# Patient Record
Sex: FEMALE | Race: WHITE | Employment: FULL TIME | URBAN - METROPOLITAN AREA
[De-identification: names, ages, dates, MRNs, and addresses within clinical notes are randomized per-mention and may not be internally consistent; named-entity substitution may affect disease eponyms.]

---

## 2017-01-17 ENCOUNTER — HOSPITAL ENCOUNTER (OUTPATIENT)
Age: 32
Discharge: HOME | End: 2017-01-17
Attending: FAMILY MEDICINE
Payer: COMMERCIAL

## 2017-01-17 VITALS
DIASTOLIC BLOOD PRESSURE: 93 MMHG | SYSTOLIC BLOOD PRESSURE: 128 MMHG | OXYGEN SATURATION: 100 % | HEART RATE: 64 BPM | RESPIRATION RATE: 16 BRPM | TEMPERATURE: 97 F

## 2017-01-17 DIAGNOSIS — H69.83 EUSTACHIAN TUBE DYSFUNCTION, BILATERAL: ICD-10-CM

## 2017-01-17 DIAGNOSIS — J01.00 ACUTE NON-RECURRENT MAXILLARY SINUSITIS: Primary | ICD-10-CM

## 2017-01-17 PROCEDURE — 99213 OFFICE O/P EST LOW 20 MIN: CPT

## 2017-01-17 PROCEDURE — 99214 OFFICE O/P EST MOD 30 MIN: CPT

## 2017-01-17 RX ORDER — FLUTICASONE PROPIONATE 50 MCG
SPRAY, SUSPENSION (ML) NASAL
Qty: 1 INHALER | Refills: 0 | Status: SHIPPED | OUTPATIENT
Start: 2017-01-17 | End: 2020-01-11

## 2017-01-17 RX ORDER — CLARITHROMYCIN 500 MG/1
500 TABLET, COATED ORAL 2 TIMES DAILY
Qty: 20 TABLET | Refills: 0 | Status: SHIPPED | OUTPATIENT
Start: 2017-01-17 | End: 2017-01-27

## 2017-01-17 NOTE — ED PROVIDER NOTES
Patient Seen in: [facility] END    History   Patient presents with:  Sinus Problem    Stated Complaint: 4 DAYS SINUS ISSUES    HPI    This 49-year-old female presents to the office with a 4-5 day history of worsening sinus pain prim 100%  LMP 01/13/2017        Physical Exam    General: WH/WN/WD, in no respiratory distress, A and O times 3  HEAD: Normocephalic, atraumatic  EYES: Sclera anicteric,  conjunctiva normal.  EARS: Tympanic membranes normal , but both are retracted, no fluid i shower. Stop if you develop nose bleeds. Qty: 1 Inhaler Refills: 0  Associated Diagnoses:Acute non-recurrent maxillary sinusitis; Eustachian tube dysfunction, bilateral        Take the Biaxin 500 mg 1 tablet with breakfast and dinner for the next 10 days.

## 2017-01-17 NOTE — ED INITIAL ASSESSMENT (HPI)
Patient presents to [facility]. Care with cc of sinus congestion/pressure and postnasal drip. States her symptoms started four days with diarrhea,fever and nausea and now has settled into her sinuses. No fever since yesterday(tmac 101),chills. No flu shot. People at

## 2017-08-30 ENCOUNTER — HOSPITAL ENCOUNTER (OUTPATIENT)
Age: 32
Discharge: HOME | End: 2017-08-30
Attending: FAMILY MEDICINE
Payer: COMMERCIAL

## 2017-08-30 VITALS
BODY MASS INDEX: 25.61 KG/M2 | WEIGHT: 150 LBS | RESPIRATION RATE: 18 BRPM | SYSTOLIC BLOOD PRESSURE: 123 MMHG | HEIGHT: 64 IN | HEART RATE: 79 BPM | TEMPERATURE: 99 F | DIASTOLIC BLOOD PRESSURE: 79 MMHG | OXYGEN SATURATION: 100 %

## 2017-08-30 DIAGNOSIS — L01.00 IMPETIGO: Primary | ICD-10-CM

## 2017-08-30 PROCEDURE — 99214 OFFICE O/P EST MOD 30 MIN: CPT

## 2017-08-30 PROCEDURE — 99213 OFFICE O/P EST LOW 20 MIN: CPT

## 2017-08-30 RX ORDER — AZITHROMYCIN 250 MG/1
250 TABLET, FILM COATED ORAL DAILY
COMMUNITY
End: 2020-01-11

## 2017-08-30 NOTE — ED PROVIDER NOTES
Patient Seen in: [facility]    History   Patient presents with:  Bite (integumentary)    Stated Complaint: insect bite on leg x 3 weeks / red streak on leg    HPI  28 yo F here with lesions noted over the L calf - yellow crusty Pulse 79   Temp 99.4 °F (37.4 °C) (Temporal)   Resp 18   Ht 162.6 cm (5' 4\")   Wt 68 kg   LMP 08/11/2017   SpO2 100%   BMI 25.75 kg/m²     Physical Exam  GEN: Not in any acute distress, making good conversation, answering appropriately   SKIN: No pallor,

## 2017-08-30 NOTE — ED INITIAL ASSESSMENT (HPI)
Patient presents with cc left lower medial leg insect bite from camping three weeks ago. Noted small red streak today with some purulent drainage. Low grade fever. Being treated for sinusitis currently with azithromicin.

## 2019-04-26 ENCOUNTER — HOSPITAL ENCOUNTER (OUTPATIENT)
Age: 34
Discharge: HOME | End: 2019-04-26
Attending: EMERGENCY MEDICINE
Payer: COMMERCIAL

## 2019-04-26 VITALS
RESPIRATION RATE: 18 BRPM | TEMPERATURE: 99 F | DIASTOLIC BLOOD PRESSURE: 94 MMHG | OXYGEN SATURATION: 100 % | SYSTOLIC BLOOD PRESSURE: 134 MMHG | HEART RATE: 79 BPM

## 2019-04-26 DIAGNOSIS — J06.9 VIRAL URI WITH COUGH: Primary | ICD-10-CM

## 2019-04-26 PROCEDURE — 99213 OFFICE O/P EST LOW 20 MIN: CPT

## 2019-04-26 PROCEDURE — 99214 OFFICE O/P EST MOD 30 MIN: CPT

## 2019-04-26 RX ORDER — BENZONATATE 100 MG/1
100 CAPSULE ORAL 3 TIMES DAILY PRN
Qty: 30 CAPSULE | Refills: 0 | Status: SHIPPED | OUTPATIENT
Start: 2019-04-26 | End: 2019-05-26

## 2019-04-26 NOTE — ED PROVIDER NOTES
Patient Seen in: [facility]    History   Patient presents with:  Cough/URI    Stated Complaint: cough congestion headache x 3 days     HPI    53-year-old female presents emergency room with chief complaint of cough.   Patient sta exudate, clear postnasal drip present, uvula midline. Tympanic membranes are clear bilaterally, there is no external auditory canal swelling, there is no mastoid erythema or tenderness. Scalp is atraumatic.   NECK: Neck is supple, there is no nuchal rigid

## 2020-01-11 ENCOUNTER — OFFICE VISIT (OUTPATIENT)
Dept: FAMILY MEDICINE CLINIC | Facility: CLINIC | Age: 35
End: 2020-01-11
Payer: COMMERCIAL

## 2020-01-11 VITALS
OXYGEN SATURATION: 99 % | HEIGHT: 64 IN | RESPIRATION RATE: 18 BRPM | BODY MASS INDEX: 20.14 KG/M2 | SYSTOLIC BLOOD PRESSURE: 112 MMHG | HEART RATE: 56 BPM | TEMPERATURE: 98 F | DIASTOLIC BLOOD PRESSURE: 82 MMHG | WEIGHT: 118 LBS

## 2020-01-11 DIAGNOSIS — H10.31 ACUTE CONJUNCTIVITIS OF RIGHT EYE, UNSPECIFIED ACUTE CONJUNCTIVITIS TYPE: Primary | ICD-10-CM

## 2020-01-11 DIAGNOSIS — R09.81 NASAL CONGESTION: ICD-10-CM

## 2020-01-11 PROCEDURE — 99202 OFFICE O/P NEW SF 15 MIN: CPT | Performed by: NURSE PRACTITIONER

## 2020-01-11 RX ORDER — ERYTHROMYCIN 5 MG/G
1 OINTMENT OPHTHALMIC 2 TIMES DAILY
Qty: 1 G | Refills: 0 | Status: SHIPPED | OUTPATIENT
Start: 2020-01-11 | End: 2020-01-16

## 2020-01-11 NOTE — PROGRESS NOTES
HPI:    Patient ID: Kerry Linares is a 37year old female. Eye Problem    This is a new problem. The current episode started yesterday. The problem occurs constantly. The problem has been unchanged. There was no injury mechanism.  The patient is exper Neck: Normal range of motion. Neck supple. Cardiovascular: Normal rate, regular rhythm and normal heart sounds. Pulmonary/Chest: Effort normal and breath sounds normal.   Abdominal: Soft. Musculoskeletal: Normal range of motion.      Lymphadenopathy: Colds are the most common illness that people get. Most adults get 2 or 3 colds per year, and most children get 5 to 7 colds per year. Colds may be caused by over 200 types of viruses. The most common of these are rhinoviruses (“rhino” refers to the nose). Follow all directions for using medicines, especially when giving them to children. Contact your healthcare provider if you have any questions about using cold medicines safely.  Before buying cold medicines, make a list of any prescription medicines you ta © 9140-5326 [address]. [address] [facility], [facility] [place]. All rights reserved. This information is not intended as a substitute for professional medical care. Always follow your healthcare professional's instructions.

## 2020-01-11 NOTE — PATIENT INSTRUCTIONS
Wash hands frequently.   Eye infections can be easily spread to unaffected eye and other family members  Bacterial eye infections are contagious for 24-48 hours after therapy begins  Viral eye infections are contagious for at least 7 days after onset  Use e relieving symptoms. Treatments may include:  · Decongestant medicines. Several types of decongestants are available without prescription. These may help reduce stuffy or runny nose symptoms. · Prescription or over-the-counter nasal sprays.  These may help infection  · Lung infection, such as bronchitis or pneumonia  · Ear infection  If you have asthma or chronic bronchitis, a cold can make your condition worse. When should I call my healthcare provider?   Call your healthcare provider right away if you have

## 2020-08-17 ENCOUNTER — APPOINTMENT (OUTPATIENT)
Dept: MRI IMAGING | Age: 35
DRG: 058 | End: 2020-08-17
Attending: PSYCHIATRY & NEUROLOGY

## 2020-08-17 ENCOUNTER — HOSPITAL ENCOUNTER (INPATIENT)
Age: 35
LOS: 2 days | Discharge: HOME | DRG: 058 | End: 2020-08-19
Attending: EMERGENCY MEDICINE | Admitting: INTERNAL MEDICINE

## 2020-08-17 DIAGNOSIS — R44.9 LEFT-SIDED SENSORY DEFICIT PRESENT: Primary | ICD-10-CM

## 2020-08-17 PROBLEM — G95.9 MYELOPATHY (HCC): Status: ACTIVE | Noted: 2020-08-17

## 2020-08-17 LAB
BASOPHILS # BLD: 0.1 K/MCL (ref 0–0.3)
BASOPHILS NFR BLD: 1 %
DIFFERENTIAL METHOD BLD: NORMAL
EOSINOPHIL # BLD: 0.2 K/MCL (ref 0.1–0.5)
EOSINOPHIL NFR BLD: 2 %
ERYTHROCYTE [DISTWIDTH] IN BLOOD: 11.9 % (ref 11–15)
HCT VFR BLD CALC: 45.6 % (ref 36–46.5)
HGB BLD-MCNC: 14.7 G/DL (ref 12–15.5)
IMM GRANULOCYTES # BLD AUTO: 0 K/MCL (ref 0–0.2)
IMM GRANULOCYTES NFR BLD: 0 %
INR PPP: 1
LYMPHOCYTES # BLD: 1.5 K/MCL (ref 1–4.8)
LYMPHOCYTES NFR BLD: 18 %
MCH RBC QN AUTO: 29.1 PG (ref 26–34)
MCHC RBC AUTO-ENTMCNC: 32.2 G/DL (ref 32–36.5)
MCV RBC AUTO: 90.1 FL (ref 78–100)
MONOCYTES # BLD: 0.5 K/MCL (ref 0.3–0.9)
MONOCYTES NFR BLD: 6 %
NEUTROPHILS # BLD: 5.9 K/MCL (ref 1.8–7.7)
NEUTROPHILS NFR BLD: 73 %
NRBC BLD MANUAL-RTO: 0 /100 WBC
PLATELET # BLD: 173 K/MCL (ref 140–450)
PROTHROMBIN TIME: 11 SEC (ref 9.7–11.8)
RBC # BLD: 5.06 MIL/MCL (ref 4–5.2)
TSH SERPL-ACNC: 2.07 MCUNITS/ML (ref 0.35–5)
WBC # BLD: 8.1 K/MCL (ref 4.2–11)

## 2020-08-17 PROCEDURE — 10002807 HB RX 258: Performed by: EMERGENCY MEDICINE

## 2020-08-17 PROCEDURE — 70553 MRI BRAIN STEM W/O & W/DYE: CPT

## 2020-08-17 PROCEDURE — 82164 ANGIOTENSIN I ENZYME TEST: CPT

## 2020-08-17 PROCEDURE — 86256 FLUORESCENT ANTIBODY TITER: CPT

## 2020-08-17 PROCEDURE — 86592 SYPHILIS TEST NON-TREP QUAL: CPT

## 2020-08-17 PROCEDURE — 10002805 HB CONTRAST AGENT: Performed by: PSYCHIATRY & NEUROLOGY

## 2020-08-17 PROCEDURE — 85610 PROTHROMBIN TIME: CPT

## 2020-08-17 PROCEDURE — 10000002 HB ROOM CHARGE MED SURG

## 2020-08-17 PROCEDURE — 10002800 HB RX 250 W HCPCS: Performed by: EMERGENCY MEDICINE

## 2020-08-17 PROCEDURE — 86255 FLUORESCENT ANTIBODY SCREEN: CPT

## 2020-08-17 PROCEDURE — 86038 ANTINUCLEAR ANTIBODIES: CPT

## 2020-08-17 PROCEDURE — 84443 ASSAY THYROID STIM HORMONE: CPT

## 2020-08-17 PROCEDURE — 36415 COLL VENOUS BLD VENIPUNCTURE: CPT

## 2020-08-17 PROCEDURE — 86617 LYME DISEASE ANTIBODY: CPT

## 2020-08-17 PROCEDURE — 86235 NUCLEAR ANTIGEN ANTIBODY: CPT

## 2020-08-17 PROCEDURE — 72157 MRI CHEST SPINE W/O & W/DYE: CPT

## 2020-08-17 PROCEDURE — 82525 ASSAY OF COPPER: CPT

## 2020-08-17 PROCEDURE — 99285 EMERGENCY DEPT VISIT HI MDM: CPT

## 2020-08-17 PROCEDURE — 85025 COMPLETE CBC W/AUTO DIFF WBC: CPT

## 2020-08-17 PROCEDURE — A9585 GADOBUTROL INJECTION: HCPCS | Performed by: PSYCHIATRY & NEUROLOGY

## 2020-08-17 PROCEDURE — 72156 MRI NECK SPINE W/O & W/DYE: CPT

## 2020-08-17 PROCEDURE — 82746 ASSAY OF FOLIC ACID SERUM: CPT

## 2020-08-17 RX ORDER — GADOBUTROL 604.72 MG/ML
7.5 INJECTION INTRAVENOUS ONCE
Status: CANCELLED | OUTPATIENT
Start: 2020-08-17 | End: 2020-08-17

## 2020-08-17 RX ORDER — GADOBUTROL 604.72 MG/ML
7.5 INJECTION INTRAVENOUS ONCE
Status: COMPLETED | OUTPATIENT
Start: 2020-08-17 | End: 2020-08-17

## 2020-08-17 RX ADMIN — GADOBUTROL 7.5 ML: 604.72 INJECTION INTRAVENOUS at 19:15

## 2020-08-17 RX ADMIN — SODIUM CHLORIDE 1000 MG: 9 INJECTION, SOLUTION INTRAVENOUS at 23:32

## 2020-08-17 ASSESSMENT — ENCOUNTER SYMPTOMS
SHORTNESS OF BREATH: 0
NAUSEA: 0
VOMITING: 0
DIARRHEA: 0
DIZZINESS: 0
NUMBNESS: 1
FEVER: 0
SORE THROAT: 0
LIGHT-HEADEDNESS: 0
CHILLS: 0
ABDOMINAL PAIN: 0
COUGH: 0
BACK PAIN: 0
ADENOPATHY: 0

## 2020-08-17 ASSESSMENT — MOVEMENT AND STRENGTH ASSESSMENTS
LUE: EQUAL STRENGTH/TONE/MOVEMENT
LLE: EQUAL STRENGTH/TONE/MOVEMENT

## 2020-08-17 ASSESSMENT — PAIN SCALES - GENERAL
PAINLEVEL_OUTOF10: 0
PAINLEVEL_OUTOF10: 0

## 2020-08-18 ENCOUNTER — APPOINTMENT (OUTPATIENT)
Dept: GENERAL RADIOLOGY | Age: 35
DRG: 058 | End: 2020-08-18
Attending: PSYCHIATRY & NEUROLOGY

## 2020-08-18 LAB
# SPEC OBTAINED: 3
ALBUMIN SERPL-MCNC: 4.6 G/DL (ref 3.6–5.1)
ALBUMIN/GLOB SERPL: 1.4 {RATIO} (ref 1–2.4)
ALP SERPL-CCNC: 51 UNITS/L (ref 45–117)
ALT SERPL-CCNC: 18 UNITS/L
ANA SER QL IA: NEGATIVE
ANION GAP SERPL CALC-SCNC: 11 MMOL/L (ref 10–20)
APPEARANCE CSF: NORMAL
AST SERPL-CCNC: 14 UNITS/L
BILIRUB SERPL-MCNC: 1.4 MG/DL (ref 0.2–1)
BUN SERPL-MCNC: 12 MG/DL (ref 6–20)
BUN/CREAT SERPL: 18 (ref 7–25)
C GATTII+NEOFOR DNA CSF QL NAA+NON-PROBE: NOT DETECTED
CALCIUM SERPL-MCNC: 9.1 MG/DL (ref 8.4–10.2)
CHLORIDE SERPL-SCNC: 106 MMOL/L (ref 98–107)
CMV DNA CSF QL NAA+NON-PROBE: NOT DETECTED
CO2 SERPL-SCNC: 25 MMOL/L (ref 21–32)
CREAT SERPL-MCNC: 0.65 MG/DL (ref 0.51–0.95)
CSF DIFFERENTIAL REMARKS(CREM): NORMAL
E COLI K1 DNA CSF QL NAA+NON-PROBE: NOT DETECTED
ERYTHROCYTE [DISTWIDTH] IN BLOOD: 12 % (ref 11–15)
EV RNA CSF QL NAA+NON-PROBE: NOT DETECTED
FOLATE SERPL-MCNC: 17.2 NG/ML
GLOBULIN SER-MCNC: 3.3 G/DL (ref 2–4)
GLUCOSE CSF-MCNC: 86 MG/DL (ref 40–70)
GLUCOSE SERPL-MCNC: 175 MG/DL (ref 65–99)
GP B STREP DNA CSF QL NAA+NON-PROBE: NOT DETECTED
HAEM INFLU DNA CSF QL NAA+NON-PROBE: NOT DETECTED
HBA1C MFR BLD: 4.5 % (ref 4.5–5.6)
HCT VFR BLD CALC: 48.3 % (ref 36–46.5)
HGB BLD-MCNC: 15.8 G/DL (ref 12–15.5)
HHV6 DNA CSF QL NAA+NON-PROBE: NOT DETECTED
HSV1 DNA CSF QL NAA+NON-PROBE: NOT DETECTED
HSV2 DNA CSF QL NAA+NON-PROBE: NOT DETECTED
INR PPP: 1.1
IRON SERPL-MCNC: 80 MCG/DL (ref 50–170)
L MONOCYTOG DNA CSF QL NAA+NON-PROBE: NOT DETECTED
MCH RBC QN AUTO: 29.5 PG (ref 26–34)
MCHC RBC AUTO-ENTMCNC: 32.7 G/DL (ref 32–36.5)
MCV RBC AUTO: 90.3 FL (ref 78–100)
N MEN DNA CSF QL NAA+NON-PROBE: NOT DETECTED
NRBC BLD MANUAL-RTO: 0 /100 WBC
NUC CELL # CSF: 2 /MCL (ref 0–10)
PARECHOVIRUS A RNA CSF QL NAA+NON-PROBE: NOT DETECTED
PLATELET # BLD: 176 K/MCL (ref 140–450)
POTASSIUM SERPL-SCNC: 3.7 MMOL/L (ref 3.4–5.1)
PROT CSF-MCNC: 38 MG/DL (ref 15–45)
PROT SERPL-MCNC: 7.9 G/DL (ref 6.4–8.2)
PROTHROMBIN TIME: 11.3 SEC (ref 9.7–11.8)
RBC # BLD: 5.35 MIL/MCL (ref 4–5.2)
RBC # CSF: NORMAL /MCL
RPR SER QL: NONREACTIVE
S PNEUM DNA CSF QL NAA+NON-PROBE: NOT DETECTED
SODIUM SERPL-SCNC: 138 MMOL/L (ref 135–145)
SPECIMEN SOURCE: NORMAL
SPECIMEN VOL CSF: 11.5 ML
TUBE # CSF: 3
VIT B12 SERPL-MCNC: 652 PG/ML (ref 211–911)
VZV DNA CSF QL NAA+NON-PROBE: NOT DETECTED
WBC # BLD: 9.6 K/MCL (ref 4.2–11)
XANTHOCHROMIA CSF QL: NORMAL

## 2020-08-18 PROCEDURE — 10002800 HB RX 250 W HCPCS: Performed by: PSYCHIATRY & NEUROLOGY

## 2020-08-18 PROCEDURE — 36415 COLL VENOUS BLD VENIPUNCTURE: CPT

## 2020-08-18 PROCEDURE — 86592 SYPHILIS TEST NON-TREP QUAL: CPT

## 2020-08-18 PROCEDURE — 10002803 HB RX 637: Performed by: PSYCHIATRY & NEUROLOGY

## 2020-08-18 PROCEDURE — 80053 COMPREHEN METABOLIC PANEL: CPT

## 2020-08-18 PROCEDURE — 83540 ASSAY OF IRON: CPT

## 2020-08-18 PROCEDURE — 85610 PROTHROMBIN TIME: CPT

## 2020-08-18 PROCEDURE — 82784 ASSAY IGA/IGD/IGG/IGM EACH: CPT

## 2020-08-18 PROCEDURE — 83036 HEMOGLOBIN GLYCOSYLATED A1C: CPT

## 2020-08-18 PROCEDURE — 10004651 HB RX, NO CHARGE ITEM: Performed by: INTERNAL MEDICINE

## 2020-08-18 PROCEDURE — 87205 SMEAR GRAM STAIN: CPT

## 2020-08-18 PROCEDURE — 85027 COMPLETE CBC AUTOMATED: CPT

## 2020-08-18 PROCEDURE — 10000002 HB ROOM CHARGE MED SURG

## 2020-08-18 PROCEDURE — 009U3ZX DRAINAGE OF SPINAL CANAL, PERCUTANEOUS APPROACH, DIAGNOSTIC: ICD-10-PCS | Performed by: RADIOLOGY

## 2020-08-18 PROCEDURE — 87483 CNS DNA AMP PROBE TYPE 12-25: CPT

## 2020-08-18 PROCEDURE — 10002807 HB RX 258: Performed by: PSYCHIATRY & NEUROLOGY

## 2020-08-18 PROCEDURE — 97161 PT EVAL LOW COMPLEX 20 MIN: CPT | Performed by: PHYSICAL THERAPIST

## 2020-08-18 PROCEDURE — 86618 LYME DISEASE ANTIBODY: CPT

## 2020-08-18 PROCEDURE — 62328 DX LMBR SPI PNXR W/FLUOR/CT: CPT

## 2020-08-18 PROCEDURE — 84157 ASSAY OF PROTEIN OTHER: CPT

## 2020-08-18 PROCEDURE — 89051 BODY FLUID CELL COUNT: CPT

## 2020-08-18 PROCEDURE — 82945 GLUCOSE OTHER FLUID: CPT

## 2020-08-18 RX ORDER — PANTOPRAZOLE SODIUM 40 MG/1
40 TABLET, DELAYED RELEASE ORAL NIGHTLY
Status: DISCONTINUED | OUTPATIENT
Start: 2020-08-18 | End: 2020-08-19 | Stop reason: HOSPADM

## 2020-08-18 RX ORDER — ONDANSETRON 2 MG/ML
4 INJECTION INTRAMUSCULAR; INTRAVENOUS EVERY 12 HOURS PRN
Status: DISCONTINUED | OUTPATIENT
Start: 2020-08-18 | End: 2020-08-19 | Stop reason: HOSPADM

## 2020-08-18 RX ORDER — METHYLPREDNISOLONE SODIUM SUCCINATE 125 MG/2ML
INJECTION, POWDER, LYOPHILIZED, FOR SOLUTION INTRAMUSCULAR; INTRAVENOUS
Status: DISPENSED
Start: 2020-08-18 | End: 2020-08-19

## 2020-08-18 RX ORDER — ACETAMINOPHEN 325 MG/1
650 TABLET ORAL EVERY 4 HOURS PRN
Status: DISCONTINUED | OUTPATIENT
Start: 2020-08-18 | End: 2020-08-19 | Stop reason: HOSPADM

## 2020-08-18 RX ORDER — PREDNISONE 10 MG/1
TABLET ORAL
Qty: 45 TABLET | Refills: 0 | Status: SHIPPED | OUTPATIENT
Start: 2020-08-18

## 2020-08-18 RX ADMIN — ACETAMINOPHEN 650 MG: 325 TABLET, FILM COATED ORAL at 18:58

## 2020-08-18 RX ADMIN — SODIUM CHLORIDE 1000 MG: 9 INJECTION, SOLUTION INTRAVENOUS at 16:52

## 2020-08-18 RX ADMIN — PANTOPRAZOLE SODIUM 40 MG: 40 TABLET, DELAYED RELEASE ORAL at 22:24

## 2020-08-18 ASSESSMENT — COLUMBIA-SUICIDE SEVERITY RATING SCALE - C-SSRS
IS THE PATIENT ABLE TO COMPLETE C-SSRS: YES
1. WITHIN THE PAST MONTH, HAVE YOU WISHED YOU WERE DEAD OR WISHED YOU COULD GO TO SLEEP AND NOT WAKE UP?: NO
2. HAVE YOU ACTUALLY HAD ANY THOUGHTS OF KILLING YOURSELF?: NO
6. HAVE YOU EVER DONE ANYTHING, STARTED TO DO ANYTHING, OR PREPARED TO DO ANYTHING TO END YOUR LIFE?: NO

## 2020-08-18 ASSESSMENT — LIFESTYLE VARIABLES
ALCOHOL_USE_STATUS: UNHEALTHY DRINKING IDENTIFIED. AUDIT C: 3 OR MORE FOR WOMEN AND 4 OR MORE FOR MEN.
HOW OFTEN DO YOU HAVE A DRINK CONTAINING ALCOHOL: 4 OR MORE TIMES PER WEEK
HOW OFTEN DO YOU HAVE 6 OR MORE DRINKS ON ONE OCCASION: NEVER
LAST DRINK YOU HAD: 49 HOURS OR MORE
AUDIT-C TOTAL SCORE: 5
HOW MANY STANDARD DRINKS CONTAINING ALCOHOL DO YOU HAVE ON A TYPICAL DAY: 3 OR 4

## 2020-08-18 ASSESSMENT — COGNITIVE AND FUNCTIONAL STATUS - GENERAL
ARE YOU BLIND OR DO YOU HAVE SERIOUS DIFFICULTY SEEING, EVEN WHEN WEARING GLASSES: NO
BASIC_MOBILITY_RAW_SCORE: 24
BECAUSE OF A PHYSICAL, MENTAL, OR EMOTIONAL CONDITION, DO YOU HAVE DIFFICULTY DOING ERRANDS ALONE: NO
BASIC_MOBILITY_CONVERTED_SCORE: 57.68
BECAUSE OF A PHYSICAL, MENTAL, OR EMOTIONAL CONDITION, DO YOU HAVE SERIOUS DIFFICULTY CONCENTRATING, REMEMBERING OR MAKING DECISIONS: NO
DO YOU HAVE DIFFICULTY DRESSING OR BATHING: NO
ARE YOU DEAF OR DO YOU HAVE SERIOUS DIFFICULTY  HEARING: NO
DO YOU HAVE SERIOUS DIFFICULTY WALKING OR CLIMBING STAIRS: NO

## 2020-08-18 ASSESSMENT — PATIENT HEALTH QUESTIONNAIRE - PHQ9
SUM OF ALL RESPONSES TO PHQ9 QUESTIONS 1 AND 2: 0
IS PATIENT ABLE TO COMPLETE PHQ2 OR PHQ9: YES
1. LITTLE INTEREST OR PLEASURE IN DOING THINGS: NOT AT ALL
2. FEELING DOWN, DEPRESSED OR HOPELESS: NOT AT ALL
CLINICAL INTERPRETATION OF PHQ9 SCORE: NO FURTHER SCREENING NEEDED
CLINICAL INTERPRETATION OF PHQ2 SCORE: NO FURTHER SCREENING NEEDED
SUM OF ALL RESPONSES TO PHQ9 QUESTIONS 1 AND 2: 0

## 2020-08-18 ASSESSMENT — ACTIVITIES OF DAILY LIVING (ADL)
ADL_BEFORE_ADMISSION: INDEPENDENT
RECENT_DECLINE_ADL: NO
PRIOR_ADL: INDEPENDENT
CHRONIC_PAIN_PRESENT: NO
ADL_SHORT_OF_BREATH: NO
PRIOR_ADL_BATHING: INDEPENDENT
ADL_SCORE: 12
EATING: INDEPENDENT
PRIOR_ADL_TOILETING: INDEPENDENT

## 2020-08-18 ASSESSMENT — PAIN SCALES - GENERAL
PAINLEVEL_OUTOF10: 0

## 2020-08-19 VITALS
TEMPERATURE: 98.1 F | HEIGHT: 64 IN | BODY MASS INDEX: 21.38 KG/M2 | WEIGHT: 125.22 LBS | SYSTOLIC BLOOD PRESSURE: 119 MMHG | RESPIRATION RATE: 14 BRPM | HEART RATE: 65 BPM | DIASTOLIC BLOOD PRESSURE: 77 MMHG | OXYGEN SATURATION: 100 %

## 2020-08-19 LAB
ACE SERPL-CCNC: 67 U/L (ref 9–67)
ALBUMIN CSF-MCNC: 21 MG/DL (ref 0–35)
ALBUMIN SERPL-MCNC: 5040 MG/DL
COPPER SERPL-MCNC: 96 MCG/DL (ref 80–155)
IGG CSF-MCNC: 2.2 MG/DL (ref 0.9–6.1)
IGG SERPL-MCNC: 892 MG/DL
IGG SYNTH RATE SER+CSF CALC-MRATE: ABNORMAL MG/DAY (ref 0–8)
IGG/ALB CLEAR SER+CSF-RTO: 0.1 (ref 0.09–0.25)
IGG/ALB CLEAR SER+CSF-RTO: 0.59
PROT CSF-MCNC: 38 MG/DL (ref 15–45)
PROT PATTERN CSF ELPH-IMP: ABNORMAL

## 2020-08-19 PROCEDURE — 10002807 HB RX 258: Performed by: PSYCHIATRY & NEUROLOGY

## 2020-08-19 PROCEDURE — 10002800 HB RX 250 W HCPCS: Performed by: PSYCHIATRY & NEUROLOGY

## 2020-08-19 RX ADMIN — SODIUM CHLORIDE 1000 MG: 9 INJECTION, SOLUTION INTRAVENOUS at 14:01

## 2020-08-19 ASSESSMENT — PAIN SCALES - GENERAL
PAINLEVEL_OUTOF10: 0

## 2020-08-20 LAB
ANCA AB TITR SER IF: <20 TITER
B BURGDOR AB CSF IA-ACNC: 0.06 LIV
B BURGDOR IGG SER QL IB: NEGATIVE
B BURGDOR IGM SER QL IB: NEGATIVE
BACTERIA CSF CULT: NORMAL
GRAM STN SPEC: NORMAL
P-ANCA TITR SER IF: <20 TITER
REPORT STATUS (RPT): NORMAL
SPECIMEN SOURCE: NORMAL

## 2020-08-21 LAB — AQP4 H2O CHANNEL IGG SERPL QL IF: NORMAL

## 2020-08-25 LAB — VDRL CSF QL: NONREACTIVE

## 2020-08-27 PROBLEM — G37.9 CLINICALLY ISOLATED SYNDROME (HCC): Status: ACTIVE | Noted: 2020-08-27

## 2020-09-23 PROBLEM — G35 MS (MULTIPLE SCLEROSIS) (HCC): Status: ACTIVE | Noted: 2020-09-23

## 2021-04-10 ENCOUNTER — HOSPITAL ENCOUNTER (OUTPATIENT)
Age: 36
Discharge: HOME | End: 2021-04-10
Attending: EMERGENCY MEDICINE
Payer: COMMERCIAL

## 2021-04-10 VITALS
HEART RATE: 87 BPM | SYSTOLIC BLOOD PRESSURE: 120 MMHG | HEIGHT: 64 IN | DIASTOLIC BLOOD PRESSURE: 80 MMHG | WEIGHT: 128 LBS | TEMPERATURE: 100 F | RESPIRATION RATE: 20 BRPM | OXYGEN SATURATION: 98 % | BODY MASS INDEX: 21.85 KG/M2

## 2021-04-10 DIAGNOSIS — J02.9 THROAT SORENESS: Primary | ICD-10-CM

## 2021-04-10 PROCEDURE — 99213 OFFICE O/P EST LOW 20 MIN: CPT

## 2021-04-10 NOTE — ED PROVIDER NOTES
Patient Seen in: [facility]      History   Patient presents with:  Sore Throat    Stated Complaint: Sore throat ,nausea    HPI/Subjective:   HPI    This is a 38-year-old female past medical history of MS on Ocrevus infusions who presents adenopathy. Lungs: Clear to auscultation bilaterally with no rales, no retractions, and no wheezing. HEART:  Regular rate and rhythm. S1 and S2. No murmurs, no rubs or gallops. ABDOMEN: Soft, nontender and nondistended. Normoactive bowel sounds.  No suellen

## 2021-04-12 NOTE — ED NOTES
Patient called stating she her COVID test is positive and that Dr. Shaheed Ruiz told her to come back to the IC for antibodies, as she has MS.  Per Dr. Pretty Melgar, patient only meets criteria for MS but because Dr. Vieyra Cousin told patient to get antibodies we c

## 2021-07-26 PROBLEM — G35 MULTIPLE SCLEROSIS (HCC): Status: ACTIVE | Noted: 2020-09-23

## 2021-11-08 ENCOUNTER — HOSPITAL ENCOUNTER (OUTPATIENT)
Age: 36
Discharge: HOME | End: 2021-11-08
Payer: COMMERCIAL

## 2021-11-08 VITALS
DIASTOLIC BLOOD PRESSURE: 90 MMHG | HEIGHT: 64 IN | OXYGEN SATURATION: 98 % | SYSTOLIC BLOOD PRESSURE: 130 MMHG | WEIGHT: 125 LBS | BODY MASS INDEX: 21.34 KG/M2 | RESPIRATION RATE: 12 BRPM | TEMPERATURE: 99 F | HEART RATE: 98 BPM

## 2021-11-08 DIAGNOSIS — J06.9 UPPER RESPIRATORY TRACT INFECTION, UNSPECIFIED TYPE: Primary | ICD-10-CM

## 2021-11-08 DIAGNOSIS — Z20.822 LAB TEST NEGATIVE FOR COVID-19 VIRUS: ICD-10-CM

## 2021-11-08 PROCEDURE — 99213 OFFICE O/P EST LOW 20 MIN: CPT

## 2021-11-08 PROCEDURE — 87880 STREP A ASSAY W/OPTIC: CPT

## 2021-11-08 PROCEDURE — 99214 OFFICE O/P EST MOD 30 MIN: CPT

## 2021-11-08 RX ORDER — LIDOCAINE HYDROCHLORIDE 20 MG/ML
5 SOLUTION OROPHARYNGEAL
Qty: 100 ML | Refills: 0 | Status: SHIPPED | OUTPATIENT
Start: 2021-11-08

## 2021-11-08 NOTE — ED PROVIDER NOTES
Patient Seen in: [facility]      History   Patient presents with:  Cough/URI  Testing    Stated Complaint: congestion,ear pain,sore throat    Subjective:   HPI  Patient is 40-year-old female past medical history of multiple sclerosis pres Systems    Positive for stated complaint: congestion,ear pain,sore throat  Other systems are as noted in HPI. Constitutional and vital signs reviewed. All other systems reviewed and negative except as noted above.     Physical Exam     ED Triage Vital tenderness. Abdominal:      General: There is no distension. Tenderness: There is no abdominal tenderness. There is no right CVA tenderness or left CVA tenderness. Musculoskeletal:      Cervical back: Normal range of motion and neck supple.    Lymp

## 2021-11-08 NOTE — ED INITIAL ASSESSMENT (HPI)
Wednesday, c/o sinus congestion and felt tired. Now has a cough, low back discomfort with left ear muffled. Denies fevers.  Here for a rapid covid test.

## 2022-08-31 ENCOUNTER — APPOINTMENT (OUTPATIENT)
Dept: URBAN - METROPOLITAN AREA CLINIC 247 | Age: 37
Setting detail: DERMATOLOGY
End: 2022-08-31

## 2022-08-31 DIAGNOSIS — L91.8 OTHER HYPERTROPHIC DISORDERS OF THE SKIN: ICD-10-CM

## 2022-08-31 DIAGNOSIS — D22 MELANOCYTIC NEVI: ICD-10-CM

## 2022-08-31 DIAGNOSIS — D18.0 HEMANGIOMA: ICD-10-CM

## 2022-08-31 DIAGNOSIS — D485 NEOPLASM OF UNCERTAIN BEHAVIOR OF SKIN: ICD-10-CM

## 2022-08-31 DIAGNOSIS — L82.1 OTHER SEBORRHEIC KERATOSIS: ICD-10-CM

## 2022-08-31 DIAGNOSIS — L81.4 OTHER MELANIN HYPERPIGMENTATION: ICD-10-CM

## 2022-08-31 PROBLEM — D22.5 MELANOCYTIC NEVI OF TRUNK: Status: ACTIVE | Noted: 2022-08-31

## 2022-08-31 PROBLEM — D18.01 HEMANGIOMA OF SKIN AND SUBCUTANEOUS TISSUE: Status: ACTIVE | Noted: 2022-08-31

## 2022-08-31 PROBLEM — D48.5 NEOPLASM OF UNCERTAIN BEHAVIOR OF SKIN: Status: ACTIVE | Noted: 2022-08-31

## 2022-08-31 PROCEDURE — OTHER SHAVE REMOVAL: OTHER

## 2022-08-31 PROCEDURE — 11310 SHAVE SKIN LESION 0.5 CM/<: CPT

## 2022-08-31 PROCEDURE — 99203 OFFICE O/P NEW LOW 30 MIN: CPT | Mod: 25

## 2022-08-31 PROCEDURE — OTHER SKIN TAG REMOVAL (COSMETIC): OTHER

## 2022-08-31 PROCEDURE — OTHER MIPS QUALITY: OTHER

## 2022-08-31 PROCEDURE — A4550 SURGICAL TRAYS: HCPCS

## 2022-08-31 PROCEDURE — OTHER COUNSELING: OTHER

## 2022-08-31 ASSESSMENT — LOCATION ZONE DERM
LOCATION ZONE: LIP
LOCATION ZONE: TRUNK
LOCATION ZONE: NECK

## 2022-08-31 ASSESSMENT — LOCATION SIMPLE DESCRIPTION DERM
LOCATION SIMPLE: CHEST
LOCATION SIMPLE: RIGHT UPPER BACK
LOCATION SIMPLE: LEFT LIP
LOCATION SIMPLE: LEFT ANTERIOR NECK
LOCATION SIMPLE: LEFT UPPER BACK
LOCATION SIMPLE: ABDOMEN

## 2022-08-31 ASSESSMENT — LOCATION DETAILED DESCRIPTION DERM
LOCATION DETAILED: LEFT NASOLABIAL FOLD
LOCATION DETAILED: EPIGASTRIC SKIN
LOCATION DETAILED: RIGHT MID-UPPER BACK
LOCATION DETAILED: LEFT INFERIOR LATERAL NECK
LOCATION DETAILED: LEFT CLAVICULAR NECK
LOCATION DETAILED: MIDDLE STERNUM
LOCATION DETAILED: LEFT SUPERIOR UPPER BACK

## 2022-08-31 NOTE — PROCEDURE: COUNSELING
Detail Level: Zone
Sunscreen Recommendations: SPF 30+ daily, broad spectrum
Detail Level: Detailed
Sunscreen Recommendations: SPF 30+, broad spectrum

## 2022-08-31 NOTE — PROCEDURE: SKIN TAG REMOVAL (COSMETIC)
Price (Use Numbers Only, No Special Characters Or $): 75
Removed With: gradle excision
Detail Level: Simple
Anesthesia Type: 1% lidocaine with epinephrine
Anesthesia Volume In Cc: 3
Consent: Written consent obtained and the risks of skin tag removal was reviewed with the patient including but not limited to bleeding, pigmentary change, infection, pain, and remote possibility of scarring.

## 2022-08-31 NOTE — PROCEDURE: SHAVE REMOVAL
Medical Necessity Information: It is in your best interest to select a reason for this procedure from the list below. All of these items fulfill various CMS LCD requirements except the new and changing color options.
Bill 22154 For Specimen Handling/Conveyance To Laboratory?: no
Notification Instructions: Patient will be notified of pathology results. However, patient instructed to call the office if not contacted within 2 weeks.
Biopsy Method: Personna blade
Was A Bandage Applied: Yes
Path Notes (To The Dermatopathologist): Check margins
Detail Level: Detailed
X Size Of Lesion In Cm (Optional): 0
Anesthesia Volume In Cc: 0.5
Medical Necessity Clause: This procedure was medically necessary because the lesion that was treated was:
Billing Type: Third-Party Bill
Consent was obtained from the patient. The patient was provided with the informed consent document and offered time to review and ask any further questions before signing consent. Prior to the procedure, the treatment site was clearly identified.
Hemostasis: Drysol
Wound Care: Petrolatum
Post-Care Instructions: I reviewed with the patient in detail post-care instructions. Keep the biopsy site dry overnight, and then wash once daily with a gentle cleanser. Afterwards, pat dry and apply Vaseline twice daily and cover with a bandage until healed. For optimal wound healing, apply SPF 30+ or keep covered until pigmentation has faded.\\n\\n\\nStressed the risk and scaring, the importance of wound care and SPF
Anesthesia Type: 1% lidocaine with epinephrine

## 2023-12-07 ENCOUNTER — APPOINTMENT (OUTPATIENT)
Dept: URBAN - METROPOLITAN AREA CLINIC 247 | Age: 38
Setting detail: DERMATOLOGY
End: 2023-12-07

## 2023-12-07 DIAGNOSIS — L57.0 ACTINIC KERATOSIS: ICD-10-CM

## 2023-12-07 DIAGNOSIS — L81.4 OTHER MELANIN HYPERPIGMENTATION: ICD-10-CM

## 2023-12-07 DIAGNOSIS — D22 MELANOCYTIC NEVI: ICD-10-CM

## 2023-12-07 DIAGNOSIS — D18.0 HEMANGIOMA: ICD-10-CM

## 2023-12-07 PROBLEM — D18.01 HEMANGIOMA OF SKIN AND SUBCUTANEOUS TISSUE: Status: ACTIVE | Noted: 2023-12-07

## 2023-12-07 PROBLEM — D22.5 MELANOCYTIC NEVI OF TRUNK: Status: ACTIVE | Noted: 2023-12-07

## 2023-12-07 PROCEDURE — OTHER LIQUID NITROGEN: OTHER

## 2023-12-07 PROCEDURE — OTHER COUNSELING: OTHER

## 2023-12-07 PROCEDURE — 99213 OFFICE O/P EST LOW 20 MIN: CPT | Mod: 25

## 2023-12-07 PROCEDURE — 17000 DESTRUCT PREMALG LESION: CPT

## 2023-12-07 ASSESSMENT — LOCATION ZONE DERM
LOCATION ZONE: ARM
LOCATION ZONE: TRUNK

## 2023-12-07 ASSESSMENT — LOCATION DETAILED DESCRIPTION DERM
LOCATION DETAILED: LEFT SUPERIOR UPPER BACK
LOCATION DETAILED: LEFT ANTERIOR SHOULDER
LOCATION DETAILED: RIGHT INFERIOR UPPER BACK
LOCATION DETAILED: RIGHT MID-UPPER BACK

## 2023-12-07 ASSESSMENT — LOCATION SIMPLE DESCRIPTION DERM
LOCATION SIMPLE: RIGHT UPPER BACK
LOCATION SIMPLE: LEFT SHOULDER
LOCATION SIMPLE: LEFT UPPER BACK

## 2023-12-07 NOTE — PROCEDURE: LIQUID NITROGEN
Render Post-Care Instructions In Note?: no
Duration Of Freeze Thaw-Cycle (Seconds): 1
Number Of Freeze-Thaw Cycles: 1 freeze-thaw cycle
Detail Level: Detailed
Consent: Risks include crusting, scabbing, blistering, scarring, darker or lighter pigmentary change, recurrence, incomplete removal and infection. The patient's consent was obtained prior to treatment.
Show Aperture Variable?: Yes
Application Tool (Optional): Liquid Nitrogen Sprayer
Post-Care Instructions: Apply Vaseline to treated areas several times daily until crusting resolves. Wear SPF 30+ daily and sun protective clothing to reduce pigmentation faster.

## 2024-11-10 ENCOUNTER — HOSPITAL ENCOUNTER (OUTPATIENT)
Age: 39
Discharge: HOME | End: 2024-11-10
Attending: EMERGENCY MEDICINE
Payer: COMMERCIAL

## 2024-11-10 ENCOUNTER — APPOINTMENT (OUTPATIENT)
Dept: GENERAL RADIOLOGY | Age: 39
End: 2024-11-10
Attending: EMERGENCY MEDICINE
Payer: COMMERCIAL

## 2024-11-10 VITALS
WEIGHT: 125 LBS | HEIGHT: 64 IN | RESPIRATION RATE: 18 BRPM | DIASTOLIC BLOOD PRESSURE: 76 MMHG | OXYGEN SATURATION: 100 % | TEMPERATURE: 98 F | SYSTOLIC BLOOD PRESSURE: 124 MMHG | HEART RATE: 79 BPM | BODY MASS INDEX: 21.34 KG/M2

## 2024-11-10 DIAGNOSIS — J18.9 COMMUNITY ACQUIRED PNEUMONIA OF RIGHT UPPER LOBE OF LUNG: Primary | ICD-10-CM

## 2024-11-10 LAB — SARS-COV-2 RNA RESP QL NAA+PROBE: NOT DETECTED

## 2024-11-10 PROCEDURE — 99204 OFFICE O/P NEW MOD 45 MIN: CPT

## 2024-11-10 PROCEDURE — 71046 X-RAY EXAM CHEST 2 VIEWS: CPT | Performed by: EMERGENCY MEDICINE

## 2024-11-10 RX ORDER — BENZONATATE 100 MG/1
100 CAPSULE ORAL 3 TIMES DAILY PRN
Qty: 20 CAPSULE | Refills: 0 | Status: SHIPPED | OUTPATIENT
Start: 2024-11-10

## 2024-11-10 RX ORDER — ALBUTEROL SULFATE 90 UG/1
2 INHALANT RESPIRATORY (INHALATION) EVERY 4 HOURS PRN
Qty: 1 EACH | Refills: 0 | Status: SHIPPED | OUTPATIENT
Start: 2024-11-10 | End: 2024-12-10

## 2024-11-10 NOTE — DISCHARGE INSTRUCTIONS
Follow-up with your primary care doctor  Take Augmentin twice a day for 10 days  Take benzonatate cough medicine 3 times a day as needed  Using albuterol inhaler 2 puffs every 4 hours  Continue Tylenol or Motrin for any fever  Return to the emergency department for any worsening symptoms or new concern

## 2024-11-10 NOTE — ED PROVIDER NOTES
Patient Seen in: [facility]      History     Chief Complaint   Patient presents with    Pain     Stated Complaint: Back/chest pain    Subjective:   HPI    39-year-old female with a history of multiple sclerosis, presents to the immediate care for complaints of fevers chills and cough.  She states that she has been taking Tylenol or Motrin every 6 hours.  Also complains of a right-sided chest pain.  She states it is worse with cough.   patient also reports having some productive sputum.  Denies any shortness of breath.  Denies nausea or vomiting.  Patient has not Had any recent contact that she is aware of.    Objective:     No pertinent past medical history.            Past Surgical History:   Procedure Laterality Date    D & c      Endometrial ablation      Hernia surgery      inginual and umbilical    Tonsillectomy      Total abdom hysterectomy  2023                Social History     Socioeconomic History    Marital status:     Number of children: 2   Tobacco Use    Smoking status: Former     Current packs/day: 0.00     Types: Cigarettes     Quit date: 10/1/2005     Years since quittin.1    Smokeless tobacco: Never   Vaping Use    Vaping status: Never Used   Substance and Sexual Activity    Alcohol use: Yes     Comment: occ    Drug use: Never    Sexual activity: Yes   Social History Narrative    Exercise: flares symptoms    Diet: healthy        UTD on eye and dental     Social Drivers of Health      Received from [facility]    Social Connections    Received from [facility]    Housing Stability              Review of Systems    Positive for stated complaint: Back/chest pain  Other systems are as noted in HPI.  Constitutional and vital signs reviewed.      All other systems reviewed and negative except as noted above.    Physical Exam     ED Triage Vitals [11/10/24 0830]   /76   Pulse 79   Resp 18   Temp 97.7 °F (36.5 °C)   Temp src  Temporal   SpO2 100 %   O2 Device None (Room air)       Current Vitals:   Vital Signs  BP: 124/76  Pulse: 79  Resp: 18  Temp: 97.7 °F (36.5 °C)  Temp src: Temporal    Oxygen Therapy  SpO2: 100 %  O2 Device: None (Room air)        Physical Exam  General: Alert and oriented. No acute distress.  HEENT: Normocephalic. No evidence of trauma. Extraocular movements are intact.  Cardiovascular exam: Regular rate and rhythm  Lungs: Clear to auscultation bilaterally.  Abdomen: Soft, nondistended, nontender.  Extremities: No evidence of deformity. No clubbing or cyanosis.  Neuro: No focal deficit is noted.    ED Course     Labs Reviewed   RAPID SARS-COV-2 BY PCR - Normal     Differential diagnosis includes a viral upper respiratory infection versus acute bronchitis versus pneumonia  A patient states that her multiple sclerosis has been PA lateral chest x-rays been ordered.  COVID swab is been ordered.  Patient states that her multiple sclerosis has been stable.  Patient currently is taking Ocrevus.     MDM         Medical Decision Making      Disposition and Plan     Clinical Impression:  1. Community acquired pneumonia of right upper lobe of lung         Disposition:  Discharge  11/10/2024  9:23 am    Follow-up:  Nadira Choe DO  [address] [address]  [phone]    Call   As needed, If symptoms worsen          Medications Prescribed:  Current Discharge Medication List        START taking these medications    Details   amoxicillin clavulanate 875-125 MG Oral Tab Take 1 tablet by mouth 2 (two) times daily for 10 days.  Qty: 20 tablet, Refills: 0      benzonatate 100 MG Oral Cap Take 1 capsule (100 mg total) by mouth 3 (three) times daily as needed for cough.  Qty: 20 capsule, Refills: 0      albuterol 108 (90 Base) MCG/ACT Inhalation Aero Soln Inhale 2 puffs into the lungs every 4 (four) hours as needed for Wheezing.  Qty: 1 each, Refills: 0                 Supplementary Documentation:

## 2024-11-10 NOTE — ED INITIAL ASSESSMENT (HPI)
Pain - bodyaches , felt feverish started Friday.  Takes tylenol/ advil every 6 hrs.  C/o pain I the chest and back pain

## 2024-11-13 ENCOUNTER — APPOINTMENT (OUTPATIENT)
Dept: CT IMAGING | Age: 39
End: 2024-11-13
Attending: EMERGENCY MEDICINE
Payer: COMMERCIAL

## 2024-11-13 ENCOUNTER — HOSPITAL ENCOUNTER (EMERGENCY)
Age: 39
Discharge: HOME | End: 2024-11-13
Attending: EMERGENCY MEDICINE
Payer: COMMERCIAL

## 2024-11-13 VITALS
HEART RATE: 80 BPM | OXYGEN SATURATION: 99 % | TEMPERATURE: 98 F | RESPIRATION RATE: 17 BRPM | SYSTOLIC BLOOD PRESSURE: 131 MMHG | DIASTOLIC BLOOD PRESSURE: 90 MMHG | BODY MASS INDEX: 21.34 KG/M2 | HEIGHT: 64 IN | WEIGHT: 125 LBS

## 2024-11-13 DIAGNOSIS — G44.209 TENSION HEADACHE: Primary | ICD-10-CM

## 2024-11-13 LAB
ALBUMIN SERPL-MCNC: 4 G/DL (ref 3.4–5)
ALBUMIN/GLOB SERPL: 1.1 {RATIO} (ref 1–2)
ALP LIVER SERPL-CCNC: 62 U/L
ALT SERPL-CCNC: 28 U/L
ANION GAP SERPL CALC-SCNC: 5 MMOL/L (ref 0–18)
AST SERPL-CCNC: 21 U/L (ref 15–37)
BASOPHILS # BLD AUTO: 0.05 X10(3) UL (ref 0–0.2)
BASOPHILS NFR BLD AUTO: 0.8 %
BILIRUB SERPL-MCNC: 0.8 MG/DL (ref 0.1–2)
BILIRUB UR QL STRIP.AUTO: NEGATIVE
BUN BLD-MCNC: 6 MG/DL (ref 9–23)
CALCIUM BLD-MCNC: 9.3 MG/DL (ref 8.5–10.1)
CHLORIDE SERPL-SCNC: 104 MMOL/L (ref 98–112)
CLARITY UR REFRACT.AUTO: CLEAR
CO2 SERPL-SCNC: 27 MMOL/L (ref 21–32)
COLOR UR AUTO: YELLOW
CREAT BLD-MCNC: 0.55 MG/DL
EGFRCR SERPLBLD CKD-EPI 2021: 118 ML/MIN/1.73M2 (ref 60–?)
EOSINOPHIL # BLD AUTO: 0.28 X10(3) UL (ref 0–0.7)
EOSINOPHIL NFR BLD AUTO: 4.5 %
ERYTHROCYTE [DISTWIDTH] IN BLOOD BY AUTOMATED COUNT: 12.2 %
GLOBULIN PLAS-MCNC: 3.6 G/DL (ref 2.8–4.4)
GLUCOSE BLD-MCNC: 102 MG/DL (ref 70–99)
GLUCOSE UR STRIP.AUTO-MCNC: NEGATIVE MG/DL
HCT VFR BLD AUTO: 42.4 %
HGB BLD-MCNC: 14.7 G/DL
IMM GRANULOCYTES # BLD AUTO: 0.01 X10(3) UL (ref 0–1)
IMM GRANULOCYTES NFR BLD: 0.2 %
KETONES UR STRIP.AUTO-MCNC: 15 MG/DL
LACTATE SERPL-SCNC: 0.8 MMOL/L (ref 0.4–2)
LEUKOCYTE ESTERASE UR QL STRIP.AUTO: NEGATIVE
LYMPHOCYTES # BLD AUTO: 1.02 X10(3) UL (ref 1–4)
LYMPHOCYTES NFR BLD AUTO: 16.4 %
MCH RBC QN AUTO: 30.8 PG (ref 26–34)
MCHC RBC AUTO-ENTMCNC: 34.7 G/DL (ref 31–37)
MCV RBC AUTO: 88.9 FL
MONOCYTES # BLD AUTO: 0.8 X10(3) UL (ref 0.1–1)
MONOCYTES NFR BLD AUTO: 12.8 %
NEUTROPHILS # BLD AUTO: 4.07 X10 (3) UL (ref 1.5–7.7)
NEUTROPHILS # BLD AUTO: 4.07 X10(3) UL (ref 1.5–7.7)
NEUTROPHILS NFR BLD AUTO: 65.3 %
NITRITE UR QL STRIP.AUTO: NEGATIVE
OSMOLALITY SERPL CALC.SUM OF ELEC: 280 MOSM/KG (ref 275–295)
PH UR STRIP.AUTO: 6 [PH] (ref 5–8)
PLATELET # BLD AUTO: 202 10(3)UL (ref 150–450)
POTASSIUM SERPL-SCNC: 3.3 MMOL/L (ref 3.5–5.1)
PROT SERPL-MCNC: 7.6 G/DL (ref 6.4–8.2)
PROT UR STRIP.AUTO-MCNC: NEGATIVE MG/DL
RBC # BLD AUTO: 4.77 X10(6)UL
RBC UR QL AUTO: NEGATIVE
SODIUM SERPL-SCNC: 136 MMOL/L (ref 136–145)
SP GR UR STRIP.AUTO: 1.01 (ref 1–1.03)
UROBILINOGEN UR STRIP.AUTO-MCNC: 0.2 MG/DL
WBC # BLD AUTO: 6.2 X10(3) UL (ref 4–11)

## 2024-11-13 PROCEDURE — 83605 ASSAY OF LACTIC ACID: CPT | Performed by: EMERGENCY MEDICINE

## 2024-11-13 PROCEDURE — 99284 EMERGENCY DEPT VISIT MOD MDM: CPT

## 2024-11-13 PROCEDURE — 81003 URINALYSIS AUTO W/O SCOPE: CPT | Performed by: EMERGENCY MEDICINE

## 2024-11-13 PROCEDURE — 96375 TX/PRO/DX INJ NEW DRUG ADDON: CPT

## 2024-11-13 PROCEDURE — 96361 HYDRATE IV INFUSION ADD-ON: CPT

## 2024-11-13 PROCEDURE — 96374 THER/PROPH/DIAG INJ IV PUSH: CPT

## 2024-11-13 PROCEDURE — 85025 COMPLETE CBC W/AUTO DIFF WBC: CPT | Performed by: EMERGENCY MEDICINE

## 2024-11-13 PROCEDURE — 70450 CT HEAD/BRAIN W/O DYE: CPT | Performed by: EMERGENCY MEDICINE

## 2024-11-13 PROCEDURE — 87040 BLOOD CULTURE FOR BACTERIA: CPT | Performed by: EMERGENCY MEDICINE

## 2024-11-13 PROCEDURE — 80053 COMPREHEN METABOLIC PANEL: CPT | Performed by: EMERGENCY MEDICINE

## 2024-11-13 PROCEDURE — 36415 COLL VENOUS BLD VENIPUNCTURE: CPT

## 2024-11-13 RX ORDER — METOCLOPRAMIDE HYDROCHLORIDE 5 MG/ML
10 INJECTION INTRAMUSCULAR; INTRAVENOUS ONCE
Status: DISCONTINUED | OUTPATIENT
Start: 2024-11-13 | End: 2024-11-13

## 2024-11-13 RX ORDER — ONDANSETRON 2 MG/ML
4 INJECTION INTRAMUSCULAR; INTRAVENOUS ONCE
Status: COMPLETED | OUTPATIENT
Start: 2024-11-13 | End: 2024-11-13

## 2024-11-13 RX ORDER — KETOROLAC TROMETHAMINE 15 MG/ML
15 INJECTION, SOLUTION INTRAMUSCULAR; INTRAVENOUS ONCE
Status: COMPLETED | OUTPATIENT
Start: 2024-11-13 | End: 2024-11-13

## 2024-11-13 RX ORDER — DIPHENHYDRAMINE HYDROCHLORIDE 50 MG/ML
25 INJECTION INTRAMUSCULAR; INTRAVENOUS ONCE
Status: DISCONTINUED | OUTPATIENT
Start: 2024-11-13 | End: 2024-11-13

## 2024-11-14 NOTE — ED PROVIDER NOTES
Patient Seen in: [facility] [place]      History     Chief Complaint   Patient presents with    Migraine     Stated Complaint: pt dx with pna on , on abx. having nausea and migraine symptoms since yes*    Subjective:   HPI      Patient presents with a headache.  The patient states that she was diagnosed with pneumonia on .  She had had a few weeks of a prolonged cough and fatigue about a week prior but then seemed to be better.  However, the previous Friday she had fevers, chills and sweats.  On  she developed pain in the right chest and across the back and that is what took her to the immediate care.  She has been on Augmentin since that time.  She states that the fevers seem to have now subsided.  The chest pain is better but she still has some soreness in the back.  She started to feel a headache yesterday.  She also feels nauseated and sort of dizzy.  She states the headache is from behind her eyes to the back of her head.  She feels soreness in her neck but that that may be from coughing.  She denies neck stiffness.  Currently she rates the headache at a 6 out of 10.  She is taking Tylenol and Advil as needed.  She denies any vision change, focal weakness or numbness.  She has not had any recent MS flare.  She does not have a history of similar headaches.    Objective:     Past Medical History:    Abscess of face    Multiple sclerosis (HCC)              Past Surgical History:   Procedure Laterality Date    D & c      Endometrial ablation      Hernia surgery      inginual and umbilical    Tonsillectomy      Total abdom hysterectomy  2023                Social History     Socioeconomic History    Marital status:     Number of children: 2   Tobacco Use    Smoking status: Former     Current packs/day: 0.00     Types: Cigarettes     Quit date: 10/1/2005     Years since quittin.1    Smokeless tobacco: Never   Vaping Use    Vaping status: Never Used    Substance and Sexual Activity    Alcohol use: Yes     Comment: occ    Drug use: Never    Sexual activity: Yes   Social History Narrative    Exercise: flares symptoms    Diet: healthy        UTD on eye and dental     Social Drivers of Health      Received from [facility]    Social Connections    Received from [facility]    Housing Stability                  Physical Exam     ED Triage Vitals [11/13/24 2043]   /88   Pulse 86   Resp 20   Temp 98.2 °F (36.8 °C)   Temp src Oral   SpO2 97 %   O2 Device None (Room air)       Current Vitals:   Vital Signs  BP: 121/85  Pulse: 85  Resp: 18  Temp: 98.2 °F (36.8 °C)  Temp src: Oral    Oxygen Therapy  SpO2: 98 %  O2 Device: None (Room air)        Physical Exam  General: Alert and oriented x3, appears mildly uncomfortable.  HEENT: Normocephalic, atraumatic, pupils equal round and reactive to light, oropharynx clear, uvula midline.  Neck: Supple.  Cardiovascular: Regular rate and rhythm, no murmurs.  Respiratory: Lungs clear to auscultation.  Abdomen: Soft, nontender, no rebound or guarding.  Extremities: No CCE.  Skin: Warm and dry.  Neurologic: Cranial nerves intact.  Strength 5/5 in all extremities-slightly weaker in the left leg which is chronic for her.  Sensory exam grossly intact.    ED Course     Labs Reviewed   COMP METABOLIC PANEL (14) - Abnormal; Notable for the following components:       Result Value    Glucose 102 (*)     Potassium 3.3 (*)     BUN 6 (*)     All other components within normal limits   URINALYSIS, ROUTINE - Abnormal; Notable for the following components:    Ketones Urine 15 (*)     All other components within normal limits   LACTIC ACID, PLASMA - Normal   CBC WITH DIFFERENTIAL WITH PLATELET   BLOOD CULTURE   BLOOD CULTURE          CT BRAIN OR HEAD (CPT=70450)    Result Date: 11/13/2024  PROCEDURE:  CT BRAIN OR HEAD (95522)  COMPARISON:  [facility] CT, BRAIN W/O CONTRAST, 9/22/2008, 6:50 PM.  INDICATIONS:   severe headache  TECHNIQUE:  Noncontrast CT scanning is performed through the brain. Dose reduction techniques were used. Dose information is transmitted to the ACR (American College of Radiology) NRDR (National Radiology Data Registry) which includes the Dose Index Registry.  PATIENT STATED HISTORY: (As transcribed by Technologist)  Pt complains of a bad headache    FINDINGS:  VENTRICLES/SULCI:  Ventricles and sulci are normal in size.  INTRACRANIAL:  There are no abnormal extraaxial fluid collections.  There is no midline shift.  There is no acute intracranial hemorrhage.  SINUSES:           No sign of acute sinusitis.  MASTOIDS:          No sign of acute inflammation. SKULL:             No evidence for fracture or osseous abnormality.            CONCLUSION:  1. No acute intracranial hemorrhage.    LOCATION:  [facility]   Dictated by (CST): Jenae Bui MD on 11/13/2024 at 10:15 PM     Finalized by (CST): Jenae Bui MD on 11/13/2024 at 10:17 PM       XR CHEST PA + LAT CHEST (CPT=71046)    Result Date: 11/10/2024  PROCEDURE:  XR CHEST PA + LAT CHEST (CPT=71046)  INDICATIONS:  Back/chest pain  COMPARISON:  [facility], XR, XR CHEST PA + LAT CHEST (JOF=55026), 10/01/2013, 11:21 AM.  TECHNIQUE:  PA and lateral chest radiographs were obtained.  PATIENT STATED HISTORY: (As transcribed by Technologist)  cough for two days    FINDINGS:  LUNGS:  Right upper lobe consolidation is noted CARDIAC:  Normal size cardiac silhouette. MEDIASTINUM:  Normal. PLEURA:  Trace bilateral effusions are likely present. BONES:  Normal for age.            CONCLUSION:  Right upper lobe consolidation is noted.   LOCATION:  [facility]   Dictated by (CST): Thom Tolbert MD on 11/10/2024 at 9:11 AM     Finalized by (CST): Thom Tolbert MD on 11/10/2024 at 9:11 AM        Medications   sodium chloride 0.9 % IV bolus 1,000 mL (0 mL Intravenous Stopped 11/13/24 5683)   ondansetron (Zofran) 4 MG/2ML injection 4 mg (4 mg Intravenous Given  11/13/24 2159)   ketorolac (Toradol) 15 MG/ML injection 15 mg (15 mg Intravenous Given 11/13/24 2231)     Patient was given the above medications and fluids and is feeling much better.     MDM      Patient presents with headache and neck pain in the setting of recent pneumonia.  Differential diagnosis includes but is not limited to intracranial hemorrhage, migraine, tension headache, meningitis and dehydration.  The patient does not have any evidence of bleeding on imaging.  She feels that the muscles in her neck may be stiff from her laying around so much.  I think this may be more of a tension headache.  Her labs are overall reassuring with no leukocytosis and a negative lactic acid level.  Her chemistry panel reveals only mild hypokalemia.  I discussed with her initially the potential of proceeding with a lumbar puncture to rule out meningitis.  After further discussion we have agreed to defer this.  She overall seems to be improving in terms of the pneumonia with the antibiotics.  Her headache improved very quickly and she does not have signs of meningismus.  She was given instructions regarding follow-up and symptoms to return for.  She is comfortable with the plan.        Medical Decision Making      Disposition and Plan     Clinical Impression:  1. Tension headache         Disposition:  There is no disposition on file for this visit.  There is no disposition time on file for this visit.    Follow-up:  Nadira Choe DO  [address] [facility] [address]  [phone]    Follow up            Medications Prescribed:  Current Discharge Medication List              Supplementary Documentation:

## 2024-11-14 NOTE — ED INITIAL ASSESSMENT (HPI)
pt dx with pna on sunday, on abx. having nausea and migraine symptoms since yesterday. Pna symptoms improving since starting abx.

## 2024-11-16 ENCOUNTER — APPOINTMENT (OUTPATIENT)
Dept: GENERAL RADIOLOGY | Age: 39
End: 2024-11-16
Attending: PHYSICIAN ASSISTANT
Payer: COMMERCIAL

## 2024-11-16 ENCOUNTER — HOSPITAL ENCOUNTER (OUTPATIENT)
Age: 39
Discharge: HOME | End: 2024-11-16
Payer: COMMERCIAL

## 2024-11-16 VITALS
WEIGHT: 125 LBS | HEART RATE: 93 BPM | OXYGEN SATURATION: 96 % | HEIGHT: 64 IN | RESPIRATION RATE: 16 BRPM | DIASTOLIC BLOOD PRESSURE: 87 MMHG | SYSTOLIC BLOOD PRESSURE: 136 MMHG | TEMPERATURE: 98 F | BODY MASS INDEX: 21.34 KG/M2

## 2024-11-16 DIAGNOSIS — J18.9 COMMUNITY ACQUIRED PNEUMONIA OF RIGHT UPPER LOBE OF LUNG: Primary | ICD-10-CM

## 2024-11-16 PROCEDURE — 99214 OFFICE O/P EST MOD 30 MIN: CPT

## 2024-11-16 PROCEDURE — 99213 OFFICE O/P EST LOW 20 MIN: CPT

## 2024-11-16 PROCEDURE — 71046 X-RAY EXAM CHEST 2 VIEWS: CPT | Performed by: PHYSICIAN ASSISTANT

## 2024-11-16 RX ORDER — AZITHROMYCIN 250 MG/1
TABLET, FILM COATED ORAL
Qty: 6 TABLET | Refills: 0 | Status: SHIPPED | OUTPATIENT
Start: 2024-11-16 | End: 2024-11-21

## 2024-11-16 NOTE — ED PROVIDER NOTES
Patient Seen in: [facility]      History     Chief Complaint   Patient presents with    Cough     Stated Complaint: PNEUMONIA    Subjective:   HPI      39-year-old female presents to the IC for evaluation of persistent fever and cough.  Was diagnosed with pneumonia 6 days ago and started on Augmentin.  Patient states she still has a 101 fever daily.    Objective:     Past Medical History:    Abscess of face    Multiple sclerosis (HCC)              Past Surgical History:   Procedure Laterality Date    D & c      Endometrial ablation      Hernia surgery      inginual and umbilical    Tonsillectomy      Total abdom hysterectomy  2023                Social History     Socioeconomic History    Marital status:     Number of children: 2   Tobacco Use    Smoking status: Former     Current packs/day: 0.00     Types: Cigarettes     Quit date: 10/1/2005     Years since quittin.1    Smokeless tobacco: Never   Vaping Use    Vaping status: Never Used   Substance and Sexual Activity    Alcohol use: Yes     Comment: occ    Drug use: Never    Sexual activity: Yes   Social History Narrative    Exercise: flares symptoms    Diet: healthy        UTD on eye and dental     Social Drivers of Health      Received from [facility]    Social Connections    Received from [facility]    Housing Stability              Review of Systems    Positive for stated complaint: PNEUMONIA  Other systems are as noted in HPI.  Constitutional and vital signs reviewed.      All other systems reviewed and negative except as noted above.    Physical Exam     ED Triage Vitals [24 1230]   /87   Pulse 88   Resp 16   Temp 98.1 °F (36.7 °C)   Temp src Oral   SpO2 93 %   O2 Device None (Room air)       Current Vitals:   Vital Signs  BP: 136/87  Pulse: 93  Resp: 16  Temp: 98.1 °F (36.7 °C)  Temp src: Oral    Oxygen Therapy  SpO2: 96 %  O2 Device: None (Room air)        Physical  Exam  Vitals and nursing note reviewed.   Constitutional:       Appearance: She is well-developed.   HENT:      Head: Atraumatic.      Right Ear: Tympanic membrane and external ear normal.      Left Ear: Tympanic membrane and external ear normal.      Nose: Congestion present.      Mouth/Throat:      Mouth: Mucous membranes are moist.      Pharynx: No posterior oropharyngeal erythema.      Comments: PND noted  Eyes:      Conjunctiva/sclera: Conjunctivae normal.   Cardiovascular:      Rate and Rhythm: Normal rate and regular rhythm.   Pulmonary:      Effort: Pulmonary effort is normal.      Breath sounds: Normal breath sounds.   Musculoskeletal:      Cervical back: Normal range of motion and neck supple.   Skin:     General: Skin is warm and dry.      Capillary Refill: Capillary refill takes less than 2 seconds.   Neurological:      Mental Status: She is alert and oriented to person, place, and time.   Psychiatric:         Mood and Affect: Mood normal.             ED Course   Labs Reviewed - No data to display     XR CHEST PA + LAT CHEST (CPT=71046)    Result Date: 11/16/2024  PROCEDURE:  XR CHEST PA + LAT CHEST (CPT=71046)  INDICATIONS:  PNEUMONIA  COMPARISON:  [facility], XR, XR CHEST PA + LAT CHEST (CPT=71046), 11/10/2024, 9:08 AM.  [facility], XR, XR CHEST PA + LAT CHEST (SKE=[id number]), 10/01/2013, 11:21 AM.  TECHNIQUE:  PA and lateral chest radiographs were obtained.  PATIENT STATED HISTORY: (As transcribed by Technologist)  Patient c/o shortness of breath and productive cough for the past 6 days.    FINDINGS:  Cardiac size and pulmonary vasculature are within normal limits. No pleural effusions. No pneumothorax.  Right upper lobe opacity again noted slightly improved since prior examination.            CONCLUSION:  Slight interval improvement of right upper lobe pneumonia.   LOCATION:  [facility]   Dictated by (CST): Jordin Suarez MD on 11/16/2024 at 1:03 PM     Finalized by (CST): Erick  MD Jordin on 11/16/2024 at 1:03 PM       CT BRAIN OR HEAD (CPT=70450)    Result Date: 11/13/2024  PROCEDURE:  CT BRAIN OR HEAD (82458)  COMPARISON:  [facility] , CT, BRAIN W/O CONTRAST, 9/22/2008, 6:50 PM.  INDICATIONS:  severe headache  TECHNIQUE:  Noncontrast CT scanning is performed through the brain. Dose reduction techniques were used. Dose information is transmitted to the ACR (American College of Radiology) NRDR (National Radiology Data Registry) which includes the Dose Index Registry.  PATIENT STATED HISTORY: (As transcribed by Technologist)  Pt complains of a bad headache    FINDINGS:  VENTRICLES/SULCI:  Ventricles and sulci are normal in size.  INTRACRANIAL:  There are no abnormal extraaxial fluid collections.  There is no midline shift.  There is no acute intracranial hemorrhage.  SINUSES:           No sign of acute sinusitis.  MASTOIDS:          No sign of acute inflammation. SKULL:             No evidence for fracture or osseous abnormality.            CONCLUSION:  1. No acute intracranial hemorrhage.    LOCATION:  [facility]   Dictated by (CST): Jenae Bui MD on 11/13/2024 at 10:15 PM     Finalized by (CST): Jenae Bui MD on 11/13/2024 at 10:17 PM       XR CHEST PA + LAT CHEST (CPT=71046)    Result Date: 11/10/2024  PROCEDURE:  XR CHEST PA + LAT CHEST (CPT=71046)  INDICATIONS:  Back/chest pain  COMPARISON:  [facility], XR, XR CHEST PA + LAT CHEST (UXD=[id number]), 10/01/2013, 11:21 AM.  TECHNIQUE:  PA and lateral chest radiographs were obtained.  PATIENT STATED HISTORY: (As transcribed by Technologist)  cough for two days    FINDINGS:  LUNGS:  Right upper lobe consolidation is noted CARDIAC:  Normal size cardiac silhouette. MEDIASTINUM:  Normal. PLEURA:  Trace bilateral effusions are likely present. BONES:  Normal for age.            CONCLUSION:  Right upper lobe consolidation is noted.   LOCATION:  [facility]   Dictated by (CST): Thom Tolbert MD on 11/10/2024 at 9:11 AM     Finalized by (CST): Thom  MD Tomasz on 11/10/2024 at 9:11 AM                 MDM        39-year-old female presents to the IC with persistent fever and cough.    Differential diagnosis reflecting the complexity of care include: recurrent pneumonia, URI, bronchitis    External chart review was done and was noted:  11/10/24 augmentin Rx for right upper lobe pneumonia    History obtained by an independent source was from: patient and     I have independently visualized the radiology images, my focus/limited interpretation: opacities noted to right upper lobe  Defer to radiologist for other/incidental findings         Compared to chest x-ray from 11/10, no significant improvement.  Given that patient is still having persistent fever and cough with no improvement at all, will add on azithromycin for atypical pneumonia coverage.  All questions answered.      Medical Decision Making      Disposition and Plan     Clinical Impression:  1. Community acquired pneumonia of right upper lobe of lung         Disposition:  Discharge  11/16/2024  1:15 pm    Follow-up:  Nadira Choe DO  [address] [address]  [phone]                Medications Prescribed:  Current Discharge Medication List        START taking these medications    Details   azithromycin (ZITHROMAX Z-BASHIR) 250 MG Oral Tab 500 mg once followed by 250 mg daily x 4 days  Qty: 6 tablet, Refills: 0                 Supplementary Documentation:

## 2024-11-16 NOTE — ED INITIAL ASSESSMENT (HPI)
Previously seen for pneumonia. Now having severe cough, headaches. Was seen in ER and got fluids and pain medication. Temporary improvement. Now still having nausea, headaches, productive cough, and fevers.

## 2025-01-09 ENCOUNTER — APPOINTMENT (OUTPATIENT)
Dept: URBAN - METROPOLITAN AREA CLINIC 247 | Age: 40
Setting detail: DERMATOLOGY
End: 2025-01-09

## 2025-01-09 DIAGNOSIS — L81.4 OTHER MELANIN HYPERPIGMENTATION: ICD-10-CM

## 2025-01-09 DIAGNOSIS — D18.0 HEMANGIOMA: ICD-10-CM

## 2025-01-09 DIAGNOSIS — D22 MELANOCYTIC NEVI: ICD-10-CM

## 2025-01-09 PROBLEM — D18.01 HEMANGIOMA OF SKIN AND SUBCUTANEOUS TISSUE: Status: ACTIVE | Noted: 2025-01-09

## 2025-01-09 PROBLEM — D22.5 MELANOCYTIC NEVI OF TRUNK: Status: ACTIVE | Noted: 2025-01-09

## 2025-01-09 PROCEDURE — 99213 OFFICE O/P EST LOW 20 MIN: CPT | Mod: 25

## 2025-01-09 PROCEDURE — OTHER MIPS QUALITY: OTHER

## 2025-01-09 PROCEDURE — OTHER COUNSELING: OTHER

## 2025-01-09 PROCEDURE — 11300 SHAVE SKIN LESION 0.5 CM/<: CPT

## 2025-01-09 PROCEDURE — 11300 SHAVE SKIN LESION 0.5 CM/<: CPT | Mod: 76

## 2025-01-09 PROCEDURE — OTHER SHAVE REMOVAL: OTHER

## 2025-01-09 PROCEDURE — A4550 SURGICAL TRAYS: HCPCS

## 2025-01-09 ASSESSMENT — LOCATION DETAILED DESCRIPTION DERM
LOCATION DETAILED: RIGHT MID-UPPER BACK
LOCATION DETAILED: RIGHT INFERIOR UPPER BACK
LOCATION DETAILED: LEFT SUPERIOR MEDIAL LOWER BACK
LOCATION DETAILED: LEFT SUPERIOR UPPER BACK
LOCATION DETAILED: LEFT SUPERIOR LATERAL LOWER BACK

## 2025-01-09 ASSESSMENT — LOCATION SIMPLE DESCRIPTION DERM
LOCATION SIMPLE: LEFT LOWER BACK
LOCATION SIMPLE: LEFT UPPER BACK
LOCATION SIMPLE: RIGHT UPPER BACK

## 2025-01-09 ASSESSMENT — LOCATION ZONE DERM: LOCATION ZONE: TRUNK

## 2025-01-09 NOTE — PROCEDURE: SHAVE REMOVAL
Render Post-Care Instructions In Note?: no
Detail Level: Detailed
Medical Necessity Clause: This procedure was medically necessary because the lesion that was treated was:
Lab Facility: 0
Hemostasis: Drysol
Anesthesia Volume In Cc: 0.5
Post-Care Instructions: Wash gently daily with soap and water. Apply Vaseline and cover with a bandage until healed. Avoid standing water, including swimming pools, hot tubs and lakes until healed to avoid increased risk of infection.
Was A Bandage Applied: Yes
Path Notes (To The Dermatopathologist): Check margins
Medical Necessity Information: It is in your best interest to select a reason for this procedure from the list below. All of these items fulfill various CMS LCD requirements except the new and changing color options.
Consent was obtained from the patient. Risks include infection, scarring, bleeding, prolonged wound healing, incomplete removal, allergy to anesthesia, nerve injury and recurrence.
Wound Care: Petrolatum
Anesthesia Type: 1% lidocaine with epinephrine
Depth Of Shave: dermis
Billing Type: Third-Party Bill
Size Of Lesion In Cm (Required): 0.4
Biopsy Method: Personna blade
Notification Instructions: Patient will be notified of pathology results; however, patient should call for results if no results are delivered by 2 weeks.
Lab: -[id number]
Size Of Lesion In Cm (Required): 0.3

## 2025-02-13 ENCOUNTER — HOSPITAL ENCOUNTER (OUTPATIENT)
Age: 40
Discharge: HOME | End: 2025-02-13
Attending: EMERGENCY MEDICINE
Payer: COMMERCIAL

## 2025-02-13 VITALS
WEIGHT: 129 LBS | SYSTOLIC BLOOD PRESSURE: 121 MMHG | DIASTOLIC BLOOD PRESSURE: 81 MMHG | HEART RATE: 76 BPM | RESPIRATION RATE: 16 BRPM | BODY MASS INDEX: 22.02 KG/M2 | TEMPERATURE: 98 F | OXYGEN SATURATION: 100 % | HEIGHT: 64 IN

## 2025-02-13 DIAGNOSIS — J01.90 ACUTE NON-RECURRENT SINUSITIS, UNSPECIFIED LOCATION: Primary | ICD-10-CM

## 2025-02-13 LAB
POCT INFLUENZA A: NEGATIVE
POCT INFLUENZA B: NEGATIVE
SARS-COV-2 RNA RESP QL NAA+PROBE: NOT DETECTED

## 2025-02-13 PROCEDURE — 87502 INFLUENZA DNA AMP PROBE: CPT | Performed by: EMERGENCY MEDICINE

## 2025-02-13 PROCEDURE — 99213 OFFICE O/P EST LOW 20 MIN: CPT

## 2025-02-13 RX ORDER — AZITHROMYCIN 250 MG/1
TABLET, FILM COATED ORAL
Qty: 6 TABLET | Refills: 0 | Status: SHIPPED | OUTPATIENT
Start: 2025-02-13 | End: 2025-02-18

## 2025-02-13 NOTE — ED PROVIDER NOTES
Patient Seen in: [facility]      History     Chief Complaint   Patient presents with    Nasal Congestion     Stated Complaint: Cold Symp    Subjective:   HPI      39-year-old female with 2 weeks of persistent nasal congestion a lot of sinus drainage.  No cough.  No shortness of breath.  Intermittent subjective fevers and chills.    Objective:     Past Medical History:    Abscess of face    Multiple sclerosis (HCC)              Past Surgical History:   Procedure Laterality Date    D & c      Endometrial ablation      Hernia surgery      inginual and umbilical    Tonsillectomy      Total abdom hysterectomy  2023                Social History     Socioeconomic History    Marital status:     Number of children: 2   Tobacco Use    Smoking status: Former     Current packs/day: 0.00     Types: Cigarettes     Quit date: 10/1/2005     Years since quittin.3    Smokeless tobacco: Never   Vaping Use    Vaping status: Never Used   Substance and Sexual Activity    Alcohol use: Yes     Comment: occ    Drug use: Never    Sexual activity: Yes   Social History Narrative    Exercise: flares symptoms    Diet: healthy        UTD on eye and dental     Social Drivers of Health      Received from [facility]    Housing Stability              Review of Systems    Positive for stated complaint: Cold Symp  Other systems are as noted in HPI.  Constitutional and vital signs reviewed.      All other systems reviewed and negative except as noted above.    Physical Exam     ED Triage Vitals [25 1039]   /81   Pulse 76   Resp 16   Temp 98.3 °F (36.8 °C)   Temp src Oral   SpO2 100 %   O2 Device None (Room air)       Current Vitals:   Vital Signs  BP: 121/81  Pulse: 76  Resp: 16  Temp: 98.3 °F (36.8 °C)  Temp src: Oral    Oxygen Therapy  SpO2: 100 %  O2 Device: None (Room air)        Physical Exam  Vitals and nursing note reviewed.   Constitutional:       Appearance: She is  well-developed.   HENT:      Head: Normocephalic and atraumatic.   Cardiovascular:      Rate and Rhythm: Normal rate and regular rhythm.      Heart sounds: Normal heart sounds.   Pulmonary:      Effort: Pulmonary effort is normal.      Breath sounds: Normal breath sounds.   Musculoskeletal:         General: Normal range of motion.   Skin:     General: Skin is warm and dry.   Neurological:      Mental Status: She is alert and oriented to person, place, and time.             ED Course     Labs Reviewed   POCT FLU TEST - Normal    Narrative:     This assay is a rapid molecular in vitro test utilizing nucleic acid amplification of influenza A and B viral RNA.   RAPID SARS-COV-2 BY PCR - Normal                   MDM      39-year-old female presenting for evaluation of 2 weeks of nasal and sinus congestion/pressure.  Still having a lot of sinus drainage.  Well-appearing here, no distress.  Will check flu and COVID swabs to rule that out but with the duration of symptoms would plan to treat her for acute sinusitis if those are negative.    Update at 1:15 AM.  Viral swabs are negative.  Think is reasonable to treat her with a course of antibiotics as it has been 2 weeks of symptoms, overall consistent with sinusitis.        Past Medical History-multiple sclerosis    Differential diagnosis before testing included flu, COVID, sinusitis    Co-morbidities that add to the complexity of management include: None    Testing ordered during this visit included viral swabs            Disposition:        Discharge  I have discussed with the patient the results of test, differential diagnosis, treatment plan, warning signs and symptoms which should prompt immediate return.  They expressed understanding of these instructions and agrees to the following plan provided.  They were given written discharge instructions and agrees to return for any concerns and voiced understanding and all questions were answered.      Medical Decision  Making      Disposition and Plan     Clinical Impression:  1. Acute non-recurrent sinusitis, unspecified location         Disposition:  Discharge  2/13/2025 11:16 am    Follow-up:  Nadira Choe,   [address] [facility] [id number] [phone]                Medications Prescribed:  Current Discharge Medication List        START taking these medications    Details   azithromycin (ZITHROMAX Z-BASHIR) 250 MG Oral Tab 500 mg once followed by 250 mg daily x 4 days  Qty: 6 tablet, Refills: 0                 Supplementary Documentation:

## 2025-02-13 NOTE — ED INITIAL ASSESSMENT (HPI)
Patient reports ongoing sinus congestion, facial pain, and \"green\" nasal discharge, patient did have low grade fever several days ago, denies recent fever, denies cough

## 2025-03-30 ENCOUNTER — HOSPITAL ENCOUNTER (OUTPATIENT)
Age: 40
Discharge: HOME | End: 2025-03-30
Attending: EMERGENCY MEDICINE
Payer: COMMERCIAL

## 2025-03-30 VITALS
HEIGHT: 64 IN | HEART RATE: 54 BPM | SYSTOLIC BLOOD PRESSURE: 134 MMHG | DIASTOLIC BLOOD PRESSURE: 87 MMHG | OXYGEN SATURATION: 100 % | WEIGHT: 128 LBS | TEMPERATURE: 98 F | BODY MASS INDEX: 21.85 KG/M2 | RESPIRATION RATE: 18 BRPM

## 2025-03-30 DIAGNOSIS — N30.00 ACUTE CYSTITIS WITHOUT HEMATURIA: Primary | ICD-10-CM

## 2025-03-30 LAB
BILIRUB UR QL STRIP: NEGATIVE
COLOR UR: YELLOW
GLUCOSE UR STRIP-MCNC: NEGATIVE MG/DL
KETONES UR STRIP-MCNC: NEGATIVE MG/DL
NITRITE UR QL STRIP: NEGATIVE
PH UR STRIP: 7 [PH]
PROT UR STRIP-MCNC: NEGATIVE MG/DL
SP GR UR STRIP: 1.01
UROBILINOGEN UR STRIP-ACNC: <2 MG/DL

## 2025-03-30 PROCEDURE — 99213 OFFICE O/P EST LOW 20 MIN: CPT

## 2025-03-30 PROCEDURE — 81002 URINALYSIS NONAUTO W/O SCOPE: CPT

## 2025-03-30 PROCEDURE — 99214 OFFICE O/P EST MOD 30 MIN: CPT

## 2025-03-30 RX ORDER — NITROFURANTOIN 25; 75 MG/1; MG/1
100 CAPSULE ORAL 2 TIMES DAILY
Qty: 14 CAPSULE | Refills: 0 | Status: SHIPPED | OUTPATIENT
Start: 2025-03-30 | End: 2025-04-06

## 2025-03-30 NOTE — DISCHARGE INSTRUCTIONS
Follow up with your primary care doctor  Take macrobid twice daily for 7 days  Drink plenty of fluids for hydration  Return if any worsening symptoms or new concern

## 2025-03-30 NOTE — ED QUICK NOTES
Patient did not have a POCT pregnancy test.  Called lab to cancel order, but they did not see the order to cancel.

## 2025-03-30 NOTE — ED PROVIDER NOTES
Patient Seen in: [facility]      History     Chief Complaint   Patient presents with    Urinary Symptoms     Stated Complaint: UTI    Subjective:   HPI      39-year-old female with a history of MS presents to the immediate care for complaints of urinary frequency urgency and dysuria.  Symptoms have been ongoing since Friday.  She denies any fevers or chills.  Denies any flank pain.  Denies nausea or vomiting.    Objective:     Past Medical History:    Abscess of face    Multiple sclerosis (HCC)              Past Surgical History:   Procedure Laterality Date    D & c      Endometrial ablation      Hernia surgery      inginual and umbilical    Tonsillectomy      Total abdom hysterectomy  2023                Social History     Socioeconomic History    Marital status:     Number of children: 2   Tobacco Use    Smoking status: Former     Current packs/day: 0.00     Types: Cigarettes     Quit date: 10/1/2005     Years since quittin.5    Smokeless tobacco: Never   Vaping Use    Vaping status: Never Used   Substance and Sexual Activity    Alcohol use: Yes     Comment: occ    Drug use: Never    Sexual activity: Yes   Social History Narrative    Exercise: flares symptoms    Diet: healthy        UTD on eye and dental     Social Drivers of Health      Received from [facility]    Housing Stability              Review of Systems    Positive for stated complaint: UTI  Other systems are as noted in HPI.  Constitutional and vital signs reviewed.      All other systems reviewed and negative except as noted above.    Physical Exam     ED Triage Vitals [25 1207]   /87   Pulse 54   Resp 18   Temp 98 °F (36.7 °C)   Temp src Oral   SpO2 100 %   O2 Device None (Room air)       Current Vitals:   Vital Signs  BP: 134/87  Pulse: 54  Resp: 18  Temp: 98 °F (36.7 °C)  Temp src: Oral    Oxygen Therapy  SpO2: 100 %  O2 Device: None (Room air)        Physical Exam  General: Alert  and oriented. No acute distress.  HEENT: Normocephalic. No evidence of trauma. Extraocular movements are intact.  Cardiovascular exam: Regular rate and rhythm  Lungs: Clear to auscultation bilaterally.  Abdomen: Soft, nondistended, nontender.  Extremities: No evidence of deformity. No clubbing or cyanosis.  Neuro: No focal deficit is noted.    ED Course     Labs Reviewed   [facility] POCT URINALYSIS DIPSTICK - Abnormal; Notable for the following components:       Result Value    Urine Clarity Slightly cloudy (*)     Blood, Urine Trace-Intact (*)     Leukocyte esterase urine Small (*)     All other components within normal limits     Urine dip positive for leukocyte esterase and blood.  Patient will be discharged home with Macrobid.  Recommend follow-up with her primary care doctor.       MDM   Patient was screened and evaluated during this visit.   As a treating physician attending to the patient, I determined, within reasonable clinical confidence and prior to discharge, that an emergency medical condition was not or was no longer present.  There was no indication for further evaluation, treatment or admission on an emergency basis.  Comprehensive verbal and written discharge and follow-up instructions were provided to help prevent relapse or worsening.  Patient was instructed to follow-up with her primary care provider for further evaluation and treatment, but to return immediately to the ER for worsening, concerning, new, changing or persisting symptoms.  I discussed the case with the patient and they had no questions, complaints, or concerns.  Patient felt comfortable going home.    ^^Please note that this report has been produced using speech recognition software and may contain errors related to that system including, but not limited to, errors in grammar, punctuation, and spelling, as well as words and phrases that possibly may have been recognized inappropriately.  If there are any questions or concerns, contact  the dictating provider for clarification        Medical Decision Making      Disposition and Plan     Clinical Impression:  1. Acute cystitis without hematuria         Disposition:  There is no disposition on file for this visit.  There is no disposition time on file for this visit.    Follow-up:  Nadira Choe DO  [address] [facility] [address]  [phone]    Call   As needed, If symptoms worsen          Medications Prescribed:  Current Discharge Medication List        START taking these medications    Details   nitrofurantoin monohydrate macro 100 MG Oral Cap Take 1 capsule (100 mg total) by mouth 2 (two) times daily for 7 days.  Qty: 14 capsule, Refills: 0                 Supplementary Documentation:

## 2025-04-17 ENCOUNTER — HOSPITAL ENCOUNTER (OUTPATIENT)
Age: 40
Discharge: HOME | End: 2025-04-17
Payer: COMMERCIAL

## 2025-04-17 VITALS
HEART RATE: 78 BPM | RESPIRATION RATE: 16 BRPM | DIASTOLIC BLOOD PRESSURE: 87 MMHG | OXYGEN SATURATION: 100 % | TEMPERATURE: 98 F | WEIGHT: 120 LBS | HEIGHT: 64 IN | BODY MASS INDEX: 20.49 KG/M2 | SYSTOLIC BLOOD PRESSURE: 127 MMHG

## 2025-04-17 DIAGNOSIS — E87.6 HYPOKALEMIA: ICD-10-CM

## 2025-04-17 DIAGNOSIS — E86.0 DEHYDRATION: ICD-10-CM

## 2025-04-17 DIAGNOSIS — R11.2 NAUSEA VOMITING AND DIARRHEA: Primary | ICD-10-CM

## 2025-04-17 DIAGNOSIS — R19.7 NAUSEA VOMITING AND DIARRHEA: Primary | ICD-10-CM

## 2025-04-17 LAB
#MXD IC: 0.9 X10ˆ3/UL (ref 0.1–1)
BASOPHILS # BLD AUTO: 0.06 X10(3) UL (ref 0–0.2)
BASOPHILS NFR BLD AUTO: 0.8 %
BILIRUB UR QL STRIP: NEGATIVE
BUN BLD-MCNC: 10 MG/DL (ref 7–18)
CHLORIDE BLD-SCNC: 100 MMOL/L (ref 98–112)
CLARITY UR: CLEAR
CO2 BLD-SCNC: 23 MMOL/L (ref 21–32)
COLOR UR: YELLOW
CREAT BLD-MCNC: 0.7 MG/DL (ref 0.55–1.02)
EGFRCR SERPLBLD CKD-EPI 2021: 111 ML/MIN/1.73M2 (ref 60–?)
EOSINOPHIL # BLD AUTO: 0.31 X10(3) UL (ref 0–0.7)
EOSINOPHIL NFR BLD AUTO: 4.3 %
ERYTHROCYTE [DISTWIDTH] IN BLOOD BY AUTOMATED COUNT: 11.9 %
GLUCOSE BLD-MCNC: 87 MG/DL (ref 70–99)
GLUCOSE UR STRIP-MCNC: NEGATIVE MG/DL
HCT VFR BLD AUTO: 46.4 % (ref 35–48)
HCT VFR BLD AUTO: 46.7 % (ref 35–48)
HCT VFR BLD CALC: 46 % (ref 34–50)
HGB BLD-MCNC: 15.6 G/DL (ref 12–16)
HGB BLD-MCNC: 16 G/DL (ref 12–16)
HGB UR QL STRIP: NEGATIVE
IMM GRANULOCYTES # BLD AUTO: 0.02 X10(3) UL (ref 0–1)
IMM GRANULOCYTES NFR BLD: 0.3 %
ISTAT IONIZED CALCIUM FOR CHEM 8: 1.12 MMOL/L (ref 1.12–1.32)
KETONES UR STRIP-MCNC: 40 MG/DL
LYMPHOCYTES # BLD AUTO: 1.05 X10(3) UL (ref 1–4)
LYMPHOCYTES # BLD AUTO: 1.1 X10ˆ3/UL (ref 1–4)
LYMPHOCYTES NFR BLD AUTO: 14.6 %
LYMPHOCYTES NFR BLD AUTO: 14.7 %
MCH RBC QN AUTO: 30.2 PG (ref 26–34)
MCH RBC QN AUTO: 30.5 PG (ref 26–34)
MCHC RBC AUTO-ENTMCNC: 33.6 G/DL (ref 31–37)
MCHC RBC AUTO-ENTMCNC: 34.3 G/DL (ref 31–37)
MCV RBC AUTO: 88.1 FL (ref 80–100)
MCV RBC AUTO: 90.6 FL (ref 80–100)
MIXED CELL %: 12.1 %
MONOCYTES # BLD AUTO: 0.86 X10(3) UL (ref 0.1–1)
MONOCYTES NFR BLD AUTO: 12 %
NEUTROPHILS # BLD AUTO: 4.89 X10 (3) UL (ref 1.5–7.7)
NEUTROPHILS # BLD AUTO: 4.89 X10(3) UL (ref 1.5–7.7)
NEUTROPHILS # BLD AUTO: 5.2 X10ˆ3/UL (ref 1.5–7.7)
NEUTROPHILS NFR BLD AUTO: 68 %
NEUTROPHILS NFR BLD AUTO: 73.2 %
NITRITE UR QL STRIP: NEGATIVE
PH UR STRIP: 5.5 [PH]
PLATELET # BLD AUTO: 227 10(3)UL (ref 150–450)
POTASSIUM BLD-SCNC: 3.2 MMOL/L (ref 3.6–5.1)
PROT UR STRIP-MCNC: NEGATIVE MG/DL
RBC # BLD AUTO: 5.12 X10ˆ6/UL (ref 3.8–5.3)
RBC # BLD AUTO: 5.3 X10(6)UL (ref 3.8–5.3)
SODIUM BLD-SCNC: 138 MMOL/L (ref 136–145)
SP GR UR STRIP: 1.01
UROBILINOGEN UR STRIP-ACNC: <2 MG/DL
WBC # BLD AUTO: 7.2 X10(3) UL (ref 4–11)
WBC # BLD AUTO: 7.2 X10ˆ3/UL (ref 4–11)

## 2025-04-17 PROCEDURE — 96374 THER/PROPH/DIAG INJ IV PUSH: CPT

## 2025-04-17 PROCEDURE — 96361 HYDRATE IV INFUSION ADD-ON: CPT

## 2025-04-17 PROCEDURE — 80047 BASIC METABLC PNL IONIZED CA: CPT

## 2025-04-17 PROCEDURE — 99214 OFFICE O/P EST MOD 30 MIN: CPT

## 2025-04-17 PROCEDURE — 85025 COMPLETE CBC W/AUTO DIFF WBC: CPT | Performed by: NURSE PRACTITIONER

## 2025-04-17 PROCEDURE — 81002 URINALYSIS NONAUTO W/O SCOPE: CPT

## 2025-04-17 RX ORDER — ONDANSETRON 4 MG/1
4 TABLET, ORALLY DISINTEGRATING ORAL EVERY 4 HOURS PRN
Qty: 10 TABLET | Refills: 0 | Status: SHIPPED | OUTPATIENT
Start: 2025-04-17 | End: 2025-04-24

## 2025-04-17 RX ORDER — SODIUM CHLORIDE 9 MG/ML
1000 INJECTION, SOLUTION INTRAVENOUS ONCE
Status: COMPLETED | OUTPATIENT
Start: 2025-04-17 | End: 2025-04-17

## 2025-04-17 RX ORDER — ONDANSETRON 2 MG/ML
4 INJECTION INTRAMUSCULAR; INTRAVENOUS ONCE
Status: COMPLETED | OUTPATIENT
Start: 2025-04-17 | End: 2025-04-17

## 2025-04-17 RX ORDER — POTASSIUM CHLORIDE 1500 MG/1
40 TABLET, EXTENDED RELEASE ORAL ONCE
Status: COMPLETED | OUTPATIENT
Start: 2025-04-17 | End: 2025-04-17

## 2025-04-17 NOTE — ED PROVIDER NOTES
Patient Seen in: [facility]      History     Chief Complaint   Patient presents with    Abdomen/Flank Pain    Nausea/Vomiting/Diarrhea     Stated Complaint: Vomiting    Subjective:   The history is provided by the patient.     Patient is a 39-year-old female here for evaluation of nausea, vomiting and diarrhea which started on Monday, 3 days ago.  Patient states the day prior to onset, patient did take Ozempic.  Patient has been taking weekly Ozempic for the past 2 weeks, this was her second dose of Ozempic.  Patient states she increased the dose from her initial dose last week.  Patient admits this is her 's medication which she has been taking.  Patient states she has taken Ozempic months prior and has tolerated in the past.      Since Monday, has experienced NVD.  Ate yesterday, bread, popcicle and soup. Yesterday, took pepto, and continued to have emesis episode.    No recent travel out of the country    No fever chills or blood in the stool    Patient complains of intermittent right lower abdominal pain    History of Present Illness               Objective:     Past Medical History:    Abscess of face    Multiple sclerosis (HCC)              Past Surgical History:   Procedure Laterality Date    D & c      Endometrial ablation  2014    Hernia surgery      inginual and umbilical    Tonsillectomy      Total abdom hysterectomy  2023                Social History     Socioeconomic History    Marital status:     Number of children: 2   Tobacco Use    Smoking status: Former     Current packs/day: 0.00     Types: Cigarettes     Quit date: 10/1/2005     Years since quittin.5    Smokeless tobacco: Never   Vaping Use    Vaping status: Never Used   Substance and Sexual Activity    Alcohol use: Yes     Comment: occ    Drug use: Never    Sexual activity: Yes   Social History Narrative    Exercise: flares symptoms    Diet: healthy        UTD on eye and dental     Social Drivers of  Health      Received from [facility]    Housing Stability              Review of Systems    Positive for stated complaint: Vomiting  Other systems are as noted in HPI.  Constitutional and vital signs reviewed.      All other systems reviewed and negative except as noted above.                  Physical Exam     ED Triage Vitals [04/17/25 0952]   BP (!) 129/91   Pulse 78   Resp 16   Temp 98.1 °F (36.7 °C)   Temp src Oral   SpO2 100 %   O2 Device None (Room air)       Current Vitals:   Vital Signs  BP: 127/87  Pulse: 78  Resp: 16  Temp: 98.1 °F (36.7 °C)  Temp src: Oral    Oxygen Therapy  SpO2: 100 %  O2 Device: None (Room air)        Physical Exam  Vitals and nursing note reviewed.   Constitutional:       General: She is not in acute distress.     Appearance: She is well-developed. She is not ill-appearing, toxic-appearing or diaphoretic.   Cardiovascular:      Rate and Rhythm: Normal rate and regular rhythm.      Heart sounds: Normal heart sounds.   Pulmonary:      Effort: Pulmonary effort is normal.   Abdominal:      General: Bowel sounds are normal.      Palpations: Abdomen is soft.      Tenderness: There is no abdominal tenderness.   Neurological:      Mental Status: She is alert.             ED Course     Labs Reviewed   [facility] POCT URINALYSIS DIPSTICK - Abnormal; Notable for the following components:       Result Value    Ketone, Urine 40 (*)     Leukocyte esterase urine Small (*)     All other components within normal limits   POCT ISTAT CHEM8 CARTRIDGE - Abnormal; Notable for the following components:    ISTAT Potassium 3.2 (*)     All other components within normal limits   CBC W AUTO DIFF   POCT CBC                                    MDM              Medical Decision Making  Differentials include but are not limited to medication reaction, viral gastroenteritis, ingestion of contaminated food, dehydration, electrolyte imbalance, colitis and acute appendicitis.  Patient is well-appearing,  afebrile and hemodynamically stable, benign abdomen on exam.  There is noted hypokalemia and ketonuria, consistent with dehydration.  1 L normal saline and Zofran IV administered.  Potassium 40 mill equivalents p.o. administered here.  Plan for brat diet, clear fluids with electrolyte replacement.  We discussed Ozempic medication is to be used continuously and not as needed with intermittently starting and stopping.  Patient is also not obese, weighing 118 pounds.  Monitoring parameters and strict ER precautions reviewed with patient who agree with plan of care.  All questions answered to patient's satisfaction    Amount and/or Complexity of Data Reviewed  Labs: ordered. Decision-making details documented in ED Course.        Disposition and Plan     Clinical Impression:  1. Nausea vomiting and diarrhea    2. Dehydration    3. Hypokalemia         Disposition:  Discharge  4/17/2025 11:17 am    Follow-up:  Nadira Choe,   [address] [facility] [address]  [phone]      As needed          Medications Prescribed:  Discharge Medication List as of 4/17/2025 11:19 AM        START taking these medications    Details   ondansetron 4 MG Oral Tablet Dispersible Take 1 tablet (4 mg total) by mouth every 4 (four) hours as needed for Nausea., Normal, Disp-10 tablet, R-0             Supplementary Documentation:

## 2025-04-17 NOTE — ED INITIAL ASSESSMENT (HPI)
N/V/D starting on Monday. Dull RLQ abdominal pain starting yesterday. Reports taking Ozempic shot on Sunday.

## (undated) NOTE — LETTER
Date & Time: 11/8/2021, 8:50 AM  Patient: Pilo Roy  Encounter Provider(s):    NOHEMI Todd       To Whom It May Concern:    Ana Solo was seen and treated in our department on 11/8/2021.  She should not return to work until November 9,

## (undated) NOTE — ED AVS SNAPSHOT
[facility] in [address] [place] [address] Drive,[address]    [address] [facility]    Phone:  [phone]    Fax:  Moisés Kelly   MRN: [id number]    Department:  [facility] [employer]   Date of Visit:  1/17/2017 sprays each nostril once daily after shower for at least the next 2 weeks or as long as you are congested. Push fluids. Humidified Air. Saline nasal spray. You may use a Neti pot for sinus rinses with saline.   You may alternate Tylenol with Ibuprofen ever primary care or a specialist physician for a follow-up visit, please tell this physician (or your personal doctor if your instructions are to return to your personal doctor) about any new or lasting problems.  The primary care or specialist physician will s We are concerned for your overall well being:    - If you are a smoker or have smoked in the last 12 months, we encourage you to explore options for quitting.     - If you have concerns related to behavioral health issues or thoughts of harming yourself,